# Patient Record
Sex: MALE | Race: WHITE | Employment: OTHER | URBAN - METROPOLITAN AREA
[De-identification: names, ages, dates, MRNs, and addresses within clinical notes are randomized per-mention and may not be internally consistent; named-entity substitution may affect disease eponyms.]

---

## 2018-05-26 ENCOUNTER — APPOINTMENT (OUTPATIENT)
Dept: GENERAL RADIOLOGY | Age: 78
End: 2018-05-26
Attending: EMERGENCY MEDICINE
Payer: MEDICARE

## 2018-05-26 ENCOUNTER — HOSPITAL ENCOUNTER (EMERGENCY)
Age: 78
Discharge: HOME | End: 2018-05-26
Attending: EMERGENCY MEDICINE
Payer: MEDICARE

## 2018-05-26 VITALS
TEMPERATURE: 98 F | OXYGEN SATURATION: 96 % | HEIGHT: 72 IN | WEIGHT: 171.08 LBS | HEART RATE: 61 BPM | BODY MASS INDEX: 23.17 KG/M2 | DIASTOLIC BLOOD PRESSURE: 78 MMHG | SYSTOLIC BLOOD PRESSURE: 174 MMHG | RESPIRATION RATE: 18 BRPM

## 2018-05-26 DIAGNOSIS — R50.9 ACUTE FEBRILE ILLNESS: Primary | ICD-10-CM

## 2018-05-26 DIAGNOSIS — J22 ACUTE LOWER RESPIRATORY INFECTION: ICD-10-CM

## 2018-05-26 LAB
ANION GAP SERPL CALC-SCNC: 8 MMOL/L (ref 5–15)
APPEARANCE UR: CLEAR
BACTERIA URNS QL MICRO: NEGATIVE /HPF
BASOPHILS # BLD: 0 K/UL (ref 0–0.1)
BASOPHILS NFR BLD: 0 % (ref 0–1)
BILIRUB UR QL: NEGATIVE
BUN SERPL-MCNC: 17 MG/DL (ref 6–20)
BUN/CREAT SERPL: 16 (ref 12–20)
CALCIUM SERPL-MCNC: 8.3 MG/DL (ref 8.5–10.1)
CAOX CRY URNS QL MICRO: ABNORMAL
CHLORIDE SERPL-SCNC: 101 MMOL/L (ref 97–108)
CO2 SERPL-SCNC: 26 MMOL/L (ref 21–32)
COLOR UR: ABNORMAL
CREAT SERPL-MCNC: 1.07 MG/DL (ref 0.7–1.3)
DIFFERENTIAL METHOD BLD: ABNORMAL
EOSINOPHIL # BLD: 0 K/UL (ref 0–0.4)
EOSINOPHIL NFR BLD: 0 % (ref 0–7)
EPITH CASTS URNS QL MICRO: ABNORMAL /LPF
ERYTHROCYTE [DISTWIDTH] IN BLOOD BY AUTOMATED COUNT: 14.3 % (ref 11.5–14.5)
GLUCOSE SERPL-MCNC: 129 MG/DL (ref 65–100)
GLUCOSE UR STRIP.AUTO-MCNC: NEGATIVE MG/DL
HCT VFR BLD AUTO: 40.7 % (ref 36.6–50.3)
HGB BLD-MCNC: 14 G/DL (ref 12.1–17)
HGB UR QL STRIP: ABNORMAL
KETONES UR QL STRIP.AUTO: NEGATIVE MG/DL
LEUKOCYTE ESTERASE UR QL STRIP.AUTO: NEGATIVE
LYMPHOCYTES # BLD: 0.3 K/UL
LYMPHOCYTES NFR BLD: 8 % (ref 12–49)
MCH RBC QN AUTO: 30.1 PG (ref 26–34)
MCHC RBC AUTO-ENTMCNC: 34.4 G/DL (ref 30–36.5)
MCV RBC AUTO: 87.5 FL (ref 80–99)
MONOCYTES # BLD: 0.8 K/UL (ref 0–1)
MONOCYTES NFR BLD: 18 % (ref 5–13)
MUCOUS THREADS URNS QL MICRO: ABNORMAL /LPF
NEUTS SEG # BLD: 3.2 K/UL (ref 1.8–8)
NEUTS SEG NFR BLD: 74 % (ref 32–75)
NITRITE UR QL STRIP.AUTO: NEGATIVE
PH UR STRIP: 5.5 [PH] (ref 5–8)
PLATELET # BLD AUTO: 137 K/UL (ref 150–400)
PMV BLD AUTO: 9.4 FL (ref 8.9–12.9)
POTASSIUM SERPL-SCNC: 3.9 MMOL/L (ref 3.5–5.1)
PROT UR STRIP-MCNC: NEGATIVE MG/DL
RBC # BLD AUTO: 4.65 M/UL (ref 4.1–5.7)
RBC #/AREA URNS HPF: ABNORMAL /HPF (ref 0–5)
RBC MORPH BLD: ABNORMAL
SODIUM SERPL-SCNC: 135 MMOL/L (ref 136–145)
SP GR UR REFRACTOMETRY: 1.01 (ref 1–1.03)
UR CULT HOLD, URHOLD: NORMAL
UROBILINOGEN UR QL STRIP.AUTO: 0.2 EU/DL (ref 0.2–1)
WBC # BLD AUTO: 4.3 K/UL (ref 4.1–11.1)
WBC URNS QL MICRO: ABNORMAL /HPF (ref 0–4)
XXWBCSUS: 0

## 2018-05-26 PROCEDURE — 99283 EMERGENCY DEPT VISIT LOW MDM: CPT

## 2018-05-26 PROCEDURE — 80048 BASIC METABOLIC PNL TOTAL CA: CPT | Performed by: EMERGENCY MEDICINE

## 2018-05-26 PROCEDURE — 85025 COMPLETE CBC W/AUTO DIFF WBC: CPT | Performed by: EMERGENCY MEDICINE

## 2018-05-26 PROCEDURE — 36415 COLL VENOUS BLD VENIPUNCTURE: CPT | Performed by: EMERGENCY MEDICINE

## 2018-05-26 PROCEDURE — 74011250636 HC RX REV CODE- 250/636: Performed by: EMERGENCY MEDICINE

## 2018-05-26 PROCEDURE — 74011250637 HC RX REV CODE- 250/637: Performed by: EMERGENCY MEDICINE

## 2018-05-26 PROCEDURE — 81001 URINALYSIS AUTO W/SCOPE: CPT | Performed by: EMERGENCY MEDICINE

## 2018-05-26 PROCEDURE — 71046 X-RAY EXAM CHEST 2 VIEWS: CPT

## 2018-05-26 RX ORDER — PHENAZOPYRIDINE HYDROCHLORIDE 200 MG/1
200 TABLET, FILM COATED ORAL 3 TIMES DAILY
Qty: 6 TAB | Refills: 0 | Status: SHIPPED | OUTPATIENT
Start: 2018-05-26 | End: 2018-05-28

## 2018-05-26 RX ORDER — DOXYCYCLINE HYCLATE 100 MG
100 TABLET ORAL 2 TIMES DAILY
Qty: 13 TAB | Refills: 0 | Status: SHIPPED | OUTPATIENT
Start: 2018-05-26

## 2018-05-26 RX ORDER — LISINOPRIL 10 MG/1
10 TABLET ORAL DAILY
COMMUNITY
End: 2022-05-19

## 2018-05-26 RX ORDER — TAMSULOSIN HYDROCHLORIDE 0.4 MG/1
0.4 CAPSULE ORAL DAILY
COMMUNITY

## 2018-05-26 RX ORDER — DOXYCYCLINE HYCLATE 100 MG
100 TABLET ORAL
Status: COMPLETED | OUTPATIENT
Start: 2018-05-26 | End: 2018-05-26

## 2018-05-26 RX ORDER — ROSUVASTATIN CALCIUM 20 MG/1
20 TABLET, COATED ORAL
COMMUNITY

## 2018-05-26 RX ORDER — GLUCOSAMINE SULFATE 1500 MG
1000 POWDER IN PACKET (EA) ORAL DAILY
COMMUNITY

## 2018-05-26 RX ADMIN — SODIUM CHLORIDE 1000 ML: 900 INJECTION, SOLUTION INTRAVENOUS at 17:53

## 2018-05-26 RX ADMIN — DOXYCYCLINE HYCLATE 100 MG: 100 TABLET, COATED ORAL at 18:47

## 2018-05-26 NOTE — ED NOTES
Pt given discharge instructions by Dr Rachel Omalley he verbalizes an understanding pt stable at time of discharge ambulates to sulma

## 2018-05-26 NOTE — ED PROVIDER NOTES
HPI Comments: This patient presents with a cough and fever. His cough began 3 days ago and is dry. It is getting worse. Typically in the late afternoon, he will get a fever as well. This is the third day in a row of a temperature in the late afternoon up to 102. About 90 minutes ago, he checked his temperature and it was 101. Immediately, he took 1 g of Tylenol. On arrival here, he is afebrile. He is recovering from a left sided knee replacement done 3-4 months ago. It is taking a while for him to heal and for the swelling to go down. Swelling of the left knee continues to improve. It is not worse. Pain in the left knee is stable. He denies any chest or abdominal pain. No rib pain. With exertion, he gets a little bit winded but this is very stable for him and certainly not worse since he started getting sick. Flu vaccination is up-to-date. He called his primary care physician and was advised to come to the emergency department for evaluation. His appetite has been poor and he has not been drinking very much. He is not a diabetic but does have hypertension. He denies any lung disease. He says that his wife has told him that he has had some audible wheezing at home but not now. Old chart reviewed - last ED visit was for a fall and facial lac in 2012. Past Medical History:   Diagnosis Date    CAD (coronary artery disease)     High cholesterol     Hypertension     Irregular heart beat     Pneumonia        Past Surgical History:   Procedure Laterality Date    HX HERNIA REPAIR      X2    HX KNEE REPLACEMENT      bilateral         History reviewed. No pertinent family history. Social History     Social History    Marital status:      Spouse name: N/A    Number of children: N/A    Years of education: N/A     Occupational History    Not on file.      Social History Main Topics    Smoking status: Never Smoker    Smokeless tobacco: Never Used    Alcohol use Yes      Comment: one a week    Drug use: No    Sexual activity: Not on file     Other Topics Concern    Not on file     Social History Narrative         ALLERGIES: Review of patient's allergies indicates no known allergies. Review of Systems   All other systems reviewed and are negative. Vitals:    05/26/18 1712   BP: 150/78   Pulse: 66   Resp: 22   Temp: 98.8 °F (37.1 °C)   SpO2: 96%   Weight: 77.6 kg (171 lb 1.2 oz)   Height: 6' (1.829 m)            Physical Exam   Constitutional: He appears well-developed and well-nourished. No distress. HENT:   Head: Normocephalic. Nose: Nose normal.   Mouth/Throat: Oropharynx is clear and moist.   Eyes: Conjunctivae are normal. Pupils are equal, round, and reactive to light. Neck: No tracheal deviation present. Cardiovascular: Normal rate, regular rhythm, normal heart sounds and intact distal pulses. Pulmonary/Chest: Effort normal and breath sounds normal. He has no wheezes. He has no rales. Abdominal: Soft. He exhibits no distension. There is no tenderness. There is no rebound. Musculoskeletal: He exhibits edema (mild L knee edema. post surgical). Neurological: He is alert. Skin: Skin is warm and dry. He is not diaphoretic. Psychiatric: He has a normal mood and affect. MDM      ED Course       Procedures    Reviewed cxr images         IVF  Tolerated po  Had orange urine at home - better here when urinated, he says - 6:32 PM        6:47 PM - DC home. Plan is course of doxy in case this is bacterial.  Could be viral as well when reviewing CBC. I have discussed case with his PCP, Dr Kelly Shields, who sent him in. Agrees with workup. Will see pt later this coming week. If worse, pt will return here.       Recent Results (from the past 12 hour(s))   CBC WITH AUTOMATED DIFF    Collection Time: 05/26/18  5:47 PM   Result Value Ref Range    WBC 4.3 4.1 - 11.1 K/uL    RBC 4.65 4.10 - 5.70 M/uL    HGB 14.0 12.1 - 17.0 g/dL    HCT 40.7 36.6 - 50.3 %    MCV 87.5 80.0 - 99.0 FL    MCH 30.1 26.0 - 34.0 PG    MCHC 34.4 30.0 - 36.5 g/dL    RDW 14.3 11.5 - 14.5 %    PLATELET 168 (L) 299 - 400 K/uL    MPV 9.4 8.9 - 12.9 FL    NEUTROPHILS 74 32 - 75 %    LYMPHOCYTES 8 (L) 12 - 49 %    MONOCYTES 18 (H) 5 - 13 %    EOSINOPHILS 0 0 - 7 %    BASOPHILS 0 0 - 1 %    ABS. NEUTROPHILS 3.2 1.8 - 8.0 K/UL    ABS. LYMPHOCYTES 0.3 K/UL    ABS. MONOCYTES 0.8 0.0 - 1.0 K/UL    ABS. EOSINOPHILS 0.0 0.0 - 0.4 K/UL    ABS. BASOPHILS 0.0 0.0 - 0.1 K/UL    DF SMEAR SCANNED      RBC COMMENTS NORMOCYTIC, NORMOCHROMIC      XXWBCSUS 0     METABOLIC PANEL, BASIC    Collection Time: 05/26/18  5:47 PM   Result Value Ref Range    Sodium 135 (L) 136 - 145 mmol/L    Potassium 3.9 3.5 - 5.1 mmol/L    Chloride 101 97 - 108 mmol/L    CO2 26 21 - 32 mmol/L    Anion gap 8 5 - 15 mmol/L    Glucose 129 (H) 65 - 100 mg/dL    BUN 17 6 - 20 MG/DL    Creatinine 1.07 0.70 - 1.30 MG/DL    BUN/Creatinine ratio 16 12 - 20      GFR est AA >60 >60 ml/min/1.73m2    GFR est non-AA >60 >60 ml/min/1.73m2    Calcium 8.3 (L) 8.5 - 10.1 MG/DL   URINALYSIS W/MICROSCOPIC    Collection Time: 05/26/18  6:25 PM   Result Value Ref Range    Color YELLOW/STRAW      Appearance CLEAR CLEAR      Specific gravity 1.010 1.003 - 1.030      pH (UA) 5.5 5.0 - 8.0      Protein NEGATIVE  NEG mg/dL    Glucose NEGATIVE  NEG mg/dL    Ketone NEGATIVE  NEG mg/dL    Bilirubin NEGATIVE  NEG      Blood TRACE (A) NEG      Urobilinogen 0.2 0.2 - 1.0 EU/dL    Nitrites NEGATIVE  NEG      Leukocyte Esterase NEGATIVE  NEG      WBC 0-4 0 - 4 /hpf    RBC 0-5 0 - 5 /hpf    Epithelial cells FEW FEW /lpf    Bacteria NEGATIVE  NEG /hpf    Mucus 2+ (A) NEG /lpf    CA Oxalate crystals FEW (A) NEG     URINE CULTURE HOLD SAMPLE    Collection Time: 05/26/18  6:25 PM   Result Value Ref Range    Urine culture hold        URINE ON HOLD IN MICROBIOLOGY DEPT FOR 3 DAYS. IF UNPRESERVED URINE IS SUBMITTED, IT CANNOT BE USED FOR ADDITIONAL TESTING AFTER 24 HRS, RECOLLECTION WILL BE REQUIRED.

## 2018-05-26 NOTE — ED TRIAGE NOTES
Pt ambulates to treatment area he states that for the past 3 days he has had a fever in the evenings anywhere from 101-102 with a nagging cough that is worse when he talks. He wakes in the morning feeling fine then by noontime he starts to feel fatigued with some aches. He called his PCP and they sent him here to be checked for pneumonia.

## 2018-05-26 NOTE — DISCHARGE INSTRUCTIONS
Bronchitis: Care Instructions  Your Care Instructions    Bronchitis is inflammation of the bronchial tubes, which carry air to the lungs. The tubes swell and produce mucus, or phlegm. The mucus and inflamed bronchial tubes make you cough. You may have trouble breathing. Most cases of bronchitis are caused by viruses like those that cause colds. Antibiotics usually do not help and they may be harmful. Bronchitis usually develops rapidly and lasts about 2 to 3 weeks in otherwise healthy people. Follow-up care is a key part of your treatment and safety. Be sure to make and go to all appointments, and call your doctor if you are having problems. It's also a good idea to know your test results and keep a list of the medicines you take. How can you care for yourself at home? · Take all medicines exactly as prescribed. Call your doctor if you think you are having a problem with your medicine. · Get some extra rest.  · Take an over-the-counter pain medicine, such as acetaminophen (Tylenol), ibuprofen (Advil, Motrin), or naproxen (Aleve) to reduce fever and relieve body aches. Read and follow all instructions on the label. · Do not take two or more pain medicines at the same time unless the doctor told you to. Many pain medicines have acetaminophen, which is Tylenol. Too much acetaminophen (Tylenol) can be harmful. · Take an over-the-counter cough medicine that contains dextromethorphan to help quiet a dry, hacking cough so that you can sleep. Avoid cough medicines that have more than one active ingredient. Read and follow all instructions on the label. · Breathe moist air from a humidifier, hot shower, or sink filled with hot water. The heat and moisture will thin mucus so you can cough it out. · Do not smoke. Smoking can make bronchitis worse. If you need help quitting, talk to your doctor about stop-smoking programs and medicines. These can increase your chances of quitting for good.   When should you call for help? Call 911 anytime you think you may need emergency care. For example, call if:  ? · You have severe trouble breathing. ?Call your doctor now or seek immediate medical care if:  ? · You have new or worse trouble breathing. ? · You cough up dark brown or bloody mucus (sputum). ? · You have a new or higher fever. ? · You have a new rash. ? Watch closely for changes in your health, and be sure to contact your doctor if:  ? · You cough more deeply or more often, especially if you notice more mucus or a change in the color of your mucus. ? · You are not getting better as expected. Where can you learn more? Go to http://kel-mary.info/. Enter H333 in the search box to learn more about \"Bronchitis: Care Instructions. \"  Current as of: May 12, 2017  Content Version: 11.4  © 6083-2713 [employer]. Care instructions adapted under license by [employer] (which disclaims liability or warranty for this information). If you have questions about a medical condition or this instruction, always ask your healthcare professional. [place] [address] any warranty or liability for your use of this information. Learning About Fever  What is a fever? A fever is a high body temperature. It's one way your body fights being sick. A fever shows that the body is responding to infection or other illnesses, both minor and severe. A fever is a symptom, not an illness by itself. A fever can be a sign that you are ill, but most fevers are not caused by a serious problem. You may have a fever with a minor illness, such as a cold. But sometimes a very serious infection may cause little or no fever. It is important to look at other symptoms, other conditions you have, and how you feel in general. In children, notice how they act and see what symptoms they complain of. What is a normal body temperature? A normal body temperature is about 98. 6ºF.  Some people have a normal temperature that is a little higher or a little lower than this. Your temperature may be a little lower in the morning than it is later in the day. It may go up during hot weather or when you exercise, wear heavy clothes, or take a hot bath. Your temperature may also be different depending on how you take it. A temperature taken in the mouth (oral) or under the arm may be a little lower than your core temperature (rectal). What is a fever temperature? A core temperature of 100.4°F or above is considered a fever. What can cause a fever? A fever may be caused by:  · Infections. This is the most common cause of a fever. Examples of infections that can cause a fever include the flu, a kidney infection, or pneumonia. · Some medicines. · Severe trauma or injury, such as a heart attack, stroke, heatstroke, or burns. · Other medical conditions, such as arthritis and some cancers. How can you treat a fever at home? · Ask your doctor if you can take an over-the-counter pain medicine, such as acetaminophen (Tylenol), ibuprofen (Advil, Motrin), or naproxen (Aleve). Be safe with medicines. Read and follow all instructions on the label. · To prevent dehydration, drink plenty of fluids. Choose water and other caffeine-free clear liquids until you feel better. If you have kidney, heart, or liver disease and have to limit fluids, talk with your doctor before you increase the amount of fluids you drink. Follow-up care is a key part of your treatment and safety. Be sure to make and go to all appointments, and call your doctor if you are having problems. It's also a good idea to know your test results and keep a list of the medicines you take. Where can you learn more? Go to http://kel-mary.info/. Enter T263 in the search box to learn more about \"Learning About Fever. \"  Current as of: March 20, 2017  Content Version: 11.4  © 9035-1064 [employer].  Care instructions adapted under license by [employer] (which disclaims liability or warranty for this information). If you have questions about a medical condition or this instruction, always ask your healthcare professional. [address] any warranty or liability for your use of this information.

## 2022-05-11 ENCOUNTER — DOCUMENTATION ONLY (OUTPATIENT)
Dept: CARDIOLOGY CLINIC | Age: 82
End: 2022-05-11

## 2022-05-11 NOTE — PROGRESS NOTES
Received records from Dr. Horton Been dated 2022. C/o bradycardia, edema, & SOB. Had recently added HCTZ 12.5 mg po daily. Stress echo (2021): LVEF 55%. No ischemia. 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 Echo (2018): LVEF 50-55%. Mild to mod TR. Father  age 66, had hemachromatosis. Mother  age 80. Cardiac meds include losartan 100 mg po daily, HCTZ 12.5 mg po daily, rosuvastatin 20 mg po daily, & ASA 81 mg po daily. On no AV ulisses blockers. 2/6 ejection murmur on exam at aortic area radiating to the precordium. TSH 1.84 in 2021. ECG (2022): Sinus bradycardia 43 bpm with PACs, 1st degree AVB, & RBBB.   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

## 2022-05-19 ENCOUNTER — OFFICE VISIT (OUTPATIENT)
Dept: CARDIOLOGY CLINIC | Age: 82
End: 2022-05-19
Payer: MEDICARE

## 2022-05-19 VITALS
DIASTOLIC BLOOD PRESSURE: 84 MMHG | WEIGHT: 187.8 LBS | SYSTOLIC BLOOD PRESSURE: 142 MMHG | HEIGHT: 71 IN | HEART RATE: 53 BPM | RESPIRATION RATE: 18 BRPM | OXYGEN SATURATION: 97 % | BODY MASS INDEX: 26.29 KG/M2

## 2022-05-19 DIAGNOSIS — R00.1 SINUS BRADYCARDIA: Primary | ICD-10-CM

## 2022-05-19 DIAGNOSIS — I45.89 CHRONOTROPIC INCOMPETENCE: ICD-10-CM

## 2022-05-19 DIAGNOSIS — I44.1 2ND DEGREE AV BLOCK: ICD-10-CM

## 2022-05-19 DIAGNOSIS — I10 PRIMARY HYPERTENSION: ICD-10-CM

## 2022-05-19 DIAGNOSIS — I44.0 1ST DEGREE AV BLOCK: ICD-10-CM

## 2022-05-19 PROBLEM — I45.10 RBBB: Status: ACTIVE | Noted: 2022-05-19

## 2022-05-19 PROCEDURE — 99204 OFFICE O/P NEW MOD 45 MIN: CPT | Performed by: INTERNAL MEDICINE

## 2022-05-19 PROCEDURE — G8753 SYS BP > OR = 140: HCPCS | Performed by: INTERNAL MEDICINE

## 2022-05-19 PROCEDURE — G8536 NO DOC ELDER MAL SCRN: HCPCS | Performed by: INTERNAL MEDICINE

## 2022-05-19 PROCEDURE — G8427 DOCREV CUR MEDS BY ELIG CLIN: HCPCS | Performed by: INTERNAL MEDICINE

## 2022-05-19 PROCEDURE — G8510 SCR DEP NEG, NO PLAN REQD: HCPCS | Performed by: INTERNAL MEDICINE

## 2022-05-19 PROCEDURE — G8754 DIAS BP LESS 90: HCPCS | Performed by: INTERNAL MEDICINE

## 2022-05-19 PROCEDURE — 1101F PT FALLS ASSESS-DOCD LE1/YR: CPT | Performed by: INTERNAL MEDICINE

## 2022-05-19 PROCEDURE — G8419 CALC BMI OUT NRM PARAM NOF/U: HCPCS | Performed by: INTERNAL MEDICINE

## 2022-05-19 PROCEDURE — G0463 HOSPITAL OUTPT CLINIC VISIT: HCPCS | Performed by: INTERNAL MEDICINE

## 2022-05-19 RX ORDER — MELATONIN 5 MG
5 CAPSULE ORAL
COMMUNITY

## 2022-05-19 RX ORDER — HYDROCHLOROTHIAZIDE 12.5 MG/1
12.5 CAPSULE ORAL DAILY
COMMUNITY

## 2022-05-19 RX ORDER — ASPIRIN 81 MG/1
81 TABLET ORAL DAILY
COMMUNITY

## 2022-05-19 RX ORDER — LOSARTAN POTASSIUM 100 MG/1
100 TABLET ORAL DAILY
COMMUNITY
Start: 2022-05-06

## 2022-05-19 RX ORDER — CHLORHEXIDINE GLUCONATE 4 G/100ML
SOLUTION TOPICAL
Qty: 1 EACH | Refills: 0 | Status: SHIPPED | OUTPATIENT
Start: 2022-05-19 | End: 2022-08-08

## 2022-05-19 RX ORDER — MELOXICAM 15 MG/1
1 TABLET ORAL DAILY
COMMUNITY
Start: 2022-05-03

## 2022-05-19 RX ORDER — LISINOPRIL 10 MG/1
10 TABLET ORAL DAILY
Qty: 90 TABLET | Refills: 1
Start: 2022-05-19 | End: 2022-05-19

## 2022-05-19 NOTE — PATIENT INSTRUCTIONS
Your dual chamber pacemaker procedure has been scheduled for 8/8/2022 at 11:00 AM, at [facility].    Please report to Admitting Department by 9:00 AM, or 2 hours prior to your scheduled procedure. Please bring a list of your current medications and medication bottles, if able, to the hospital on this day. You will be unable to drive after your procedure so please make sure to bring someone with you to your procedure. You will need to have nothing to eat or drink after midnight, the night prior to your procedure. You may have small sips of water, if needed, to take with your medication. You will also need to see Dr. Dewey Aschoff Nurse Practitioner, Mickey Stewart, in office prior to your procedure. An appointment has been scheduled for 7/18/22 at 10:30 AM.    You will need labs drawn prior to your procedure. Please have this done when you come in for your pre-procedure visit with Mickey Stewart, BURAK. You should not stop taking any of your scheduled medications prior to your scheduled procedure. After your procedure, you will need to follow up with Dr. Dewey Aschoff nurse for a wound and device check. Your follow-up appointment has been scheduled for 8/19/22 at 10:20 AM.     Hibiclens 4% topical solution has been ordered and sent into your pharmacy  Patient it start Hibiclens application 5 days prior to procedure date    Directions Hibiclens 4%: Start cleanse 5 days prior to procedure   1. Rinse area (upper chest and upper arms) with water. 2. Apply minimum amount necessary to scrub the upper chest area from shoulder/neck to mid line of chest and to below the nipple each of  5 nights before the day of the procedure  3. Let solution dry.

## 2022-05-19 NOTE — PROGRESS NOTES
Chief Complaint   Patient presents with    Shortness of Breath     on lite exertion-evaluation for pacemaker     1. Have you been to the ER, urgent care clinic since your last visit? Hospitalized since your last visit? No    2. Have you seen or consulted any other health care providers outside of the [address] [place] [address] since your last visit? Include any pap smears or colon screening.  No.

## 2022-05-19 NOTE — PROGRESS NOTES
Cardiac Electrophysiology OFFICE Consultation Note     Subjective:       Alanis Cabrera is an 79 y.o. patient who presents for consideration for pacemaker implant.    He was kindly referred by Dr. Vita Londono. Sinus bradycardia is down to the 40s with 1st degree AVB & RBBB on ECG in 2022. Chronotropic incompetence & transient 2nd degree AVB reportedly noted during stress echo in 2021.       LVEF normal on stress echo in 2021. Symptomatic with MYERS & fatigue.       BP borderline elevated. Previous:   TSH 1.84 in 2021. Dr. Vita Londono is primary cardiologist.     Father  age 66, had hemachromatosis.  Mother  age 80.  Brother with pacemaker. Problem List    Sinus bradycardia ICD-10-CM: R00.1   ICD-9-CM: 427.89 2022     1st degree AV block ICD-10-CM: I44.0   ICD-9-CM: 426.11 2022     2nd degree AV block ICD-10-CM: I44.1   ICD-9-CM: 426.13 2022     RBBB ICD-10-CM: I45.10   ICD-9-CM: 426.4 2022           Current Outpatient Medications   Medication Sig Dispense Refill   · hydroCHLOROthiazide (MICROZIDE) 12.5 mg capsule Take 12.5 mg by mouth daily. · losartan (COZAAR) 100 mg tablet Take 100 mg by mouth daily. · meloxicam (MOBIC) 15 mg tablet Take 1 Tablet by mouth daily. · melatonin 5 mg cap capsule Take 5 mg by mouth nightly. · aspirin delayed-release 81 mg tablet Take 81 mg by mouth daily. · rosuvastatin (CRESTOR) 20 mg tablet Take 20 mg by mouth nightly. · tamsulosin (FLOMAX) 0.4 mg capsule Take 0.4 mg by mouth daily. · cholecalciferol (VITAMIN D3) 1,000 unit cap Take 1,000 Units by mouth daily. · doxycycline (VIBRA-TABS) 100 mg tablet Take 1 Tab by mouth two (2) times a day.  (Patient not taking: Reported on 2022) 13 Tab 0     No Known Allergies   Past Medical History:   Diagnosis Date   · CAD (coronary artery disease)   · High cholesterol   · Hypertension   · Irregular heart beat   · Pneumonia     Past Surgical History:   Procedure Laterality Date   · HX HERNIA REPAIR   X2   · HX KNEE REPLACEMENT   bilateral     No family history w pacer  Social History     Tobacco Use   · Smoking status: Never Smoker   · Smokeless tobacco: Never Used   Substance Use Topics   · Alcohol use: Yes   Comment: one a week         Review of Systems: Review of all other systems otherwise negative. Constitutional: Negative for fever, chills, weight loss, + malaise/fatigue. HEENT: Negative for nosebleeds, vision changes. Respiratory: Negative for cough, hemoptysis. Cardiovascular: Negative for chest pain, palpitations, orthopnea, claudication, + occasional leg swelling, no syncope, and PND. + MYERS   Gastrointestinal: Negative for nausea, vomiting, diarrhea, blood in stool and melena. Genitourinary: Negative for dysuria, and hematuria. Musculoskeletal: Negative for myalgias, arthralgia. Skin: Negative for rash. Heme: Does not bleed or bruise easily. Neurological: Negative for speech change and focal weakness.       Objective:   Visit Vitals  BP (!) 142/84   Pulse (!) 53   Resp 18   Ht 5' 11\" (1.803 m)   Wt 187 lb 12.8 oz (85.2 kg)   SpO2 97%   BMI 26.19 kg/m²         Physical Exam:   Constitutional: Well-developed and well-nourished. No respiratory distress. Head: Normocephalic and atraumatic. Eyes: Pupils are equal, round. ENT: Hearing grossly normal.   Neck: Supple. No JVD present. Cardiovascular: slow rate, regular rhythm. Exam reveals no gallop and no friction rub. No murmur heard. Pulmonary/Chest: Effort normal and breath sounds normal. No wheezes. Abdominal: Soft, no tenderness. Musculoskeletal: Moves extremities independently.  Normal gait. Vasc/lymphatic: No edema. Neurological: Alert,oriented. Skin: Skin is warm and dry   Psychiatric: Normal mood and affect. Behavior is normal. Judgment and thought content normal.       ECG (05/05/2022):  Sinus bradycardia 43 bpm with PACs, 1st degree AVB, & RBBB.  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 Assessment/Plan:     Imaging/Studies:   Stress echo (11/23/2021): LVEF 55%.  No ischemia.  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         Echo (02/07/2018): LVEF 50-55%.  Mild to mod TR. ICD-10-CM ICD-9-CM    1. Sinus bradycardia  R00.1 427.89    2. Primary hypertension  I10 401.9    3. 1st degree AV block  I44.0 426.11    4. 2nd degree AV block  I44.1 426.13    5. Chronotropic incompetence  I45.89 426.89          Sinus bradycardia: Also with chronotropic incompetence noted via stress echo per Dr. Tae Pemberton.  1st degree AVB at baseline, had intermittent 2nd degree AVB during stress echo. Irreversible.    Symptomatic with SOB & fatigue.      Reviewed risks/benefits of dual chamber pacemaker implant.  He agrees to proceed with scheduling. HTN: BP suboptimally controlled here today, sometimes borderline high at home per his report.  BP could be controlled with improved HR.  may take hydrochlorothiazide 25 mg once a day    Risks involve device implant include but are not limited to bleeding, infection, valvular damage, heart attack, stroke, lung collapse (pneumothorax or hemothorax), heart collapse (pericardial tamponade), heart perforation, kidney failure, death. Elective or emergency surgery may be required to repair some of these complications. Prolonged hospitalization would be required. General anesthesia may be required for the procedure. Future Appointments   Date Time Provider Christiano Garcia   7/18/2022 10:30 AM BURAK Lemus BS AMB   8/19/2022 10:20 AM PACEMAKER3, ARGENTINA POTTS AMB   8/19/2022 10:30 AM WOUND CHECKS, ARGENTINA ENGEL BS AMB         Thank you for involving me in this patient's care and please call with further concerns or questions. Eva Elkins M.D.    Electrophysiology/Cardiology   [facility]   [address], [place], [address] [place] [address]                             [address] [address]   [phone]

## 2022-06-28 ENCOUNTER — DOCUMENTATION ONLY (OUTPATIENT)
Dept: CARDIOLOGY CLINIC | Age: 82
End: 2022-06-28

## 2022-07-21 NOTE — PROGRESS NOTES
Cardiac Electrophysiology OFFICE Note     Subjective:      Sunday Foote is a 79 y.o. patient who presents for H&P update prior to upcoming planned dual chamber pacemaker implant (scheduled for 2022). Sinus bradycardia is down to the 40s with 1st degree AVB & RBBB on ECG in 2022. Chronotropic incompetence & transient 2nd degree AVB reportedly noted during stress echo in 2021. LVEF normal on stress echo in 2021. Symptomatic with MYERS & fatigue. States he especially notes shortness of breath when going up & down stairs. BP controlled. Previous:  TSH 1.84 in 2021. Dr. Magen Herrera is primary cardiologist.    Father  age 66, had hemachromatosis. Mother  age 80. Brother with pacemaker. Problem List  Date Reviewed: 2022            Codes Class Noted    Sinus bradycardia ICD-10-CM: R00.1  ICD-9-CM: 427.89  2022        1st degree AV block ICD-10-CM: I44.0  ICD-9-CM: 426.11  2022        2nd degree AV block ICD-10-CM: I44.1  ICD-9-CM: 426.13  2022        RBBB ICD-10-CM: I45.10  ICD-9-CM: 426.4  2022           Current Outpatient Medications   Medication Sig Dispense Refill    hydroCHLOROthiazide (MICROZIDE) 12.5 mg capsule Take 12.5 mg by mouth daily. losartan (COZAAR) 100 mg tablet Take 100 mg by mouth daily. meloxicam (MOBIC) 15 mg tablet Take 1 Tablet by mouth daily. melatonin 5 mg cap capsule Take 5 mg by mouth nightly. aspirin delayed-release 81 mg tablet Take 81 mg by mouth daily. rosuvastatin (CRESTOR) 20 mg tablet Take 20 mg by mouth nightly. tamsulosin (FLOMAX) 0.4 mg capsule Take 0.4 mg by mouth daily. cholecalciferol (VITAMIN D3) 25 mcg (1,000 unit) cap Take 1,000 Units by mouth daily. chlorhexidine (Hibiclens) 4 % liquid Apply to the upper chest area from shoulder/neck to mid line of chest and to below the nipple every day, 5 days prior to the procedure.  (Patient not taking: Reported on 7/26/2022) 1 Each 0    doxycycline (VIBRA-TABS) 100 mg tablet Take 1 Tab by mouth two (2) times a day. (Patient not taking: No sig reported) 13 Tab 0     No Known Allergies  Past Medical History:   Diagnosis Date    CAD (coronary artery disease)     High cholesterol     Hypertension     Irregular heart beat     Pneumonia      Past Surgical History:   Procedure Laterality Date    HX HERNIA REPAIR      X2    HX KNEE REPLACEMENT      bilateral     No family history on file. Social History     Tobacco Use    Smoking status: Never    Smokeless tobacco: Never   Substance Use Topics    Alcohol use: Yes     Comment: one a week        Review of Systems: Review of all other systems otherwise negative. Constitutional: Negative for fever, chills, weight loss, + malaise/fatigue. HEENT: Negative for nosebleeds, vision changes. Respiratory: Negative for cough, hemoptysis  Cardiovascular: Negative for chest pain, palpitations, orthopnea, claudication, + occasional leg swelling, no syncope, and PND. + MYERS  Gastrointestinal: Negative for nausea, vomiting, diarrhea, blood in stool and melena. Genitourinary: Negative for dysuria, and hematuria. Musculoskeletal: Negative for myalgias, arthralgia. Skin: Negative for rash. Heme: Does not bleed or bruise easily. Neurological: Negative for speech change and focal weakness     Objective:     Visit Vitals  /80 (BP 1 Location: Left upper arm, BP Patient Position: Sitting, BP Cuff Size: Adult)   Pulse 64   Resp 18   Ht 5' 11\" (1.803 m)   Wt 187 lb 6.4 oz (85 kg)   SpO2 97%   BMI 26.14 kg/m²      Physical Exam:   Constitutional: Well-developed and well-nourished. No respiratory distress. Head: Normocephalic and atraumatic. Eyes: Pupils are equal, round. ENT: Hearing grossly normal.  Neck: Supple. No JVD present. Cardiovascular: Slow rate, regular rhythm. Exam reveals no gallop and no friction rub. No murmur heard.   Pulmonary/Chest: Effort normal and breath sounds normal. No wheezes. Abdominal: Soft, no tenderness. Musculoskeletal: Moves extremities independently. Normal gait. Vasc/lymphatic: No edema. Neurological: Alert,oriented. Skin: Skin is warm and dry. Psychiatric: Normal mood and affect. Behavior is normal. Judgment and thought content normal.      EKG (05/05/2022): Sinus bradycardia 43 bpm with PACs, 1st degree AVB, & RBBB. 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 Assessment/Plan:     Imaging/Studies:  Stress echo (11/23/2021): LVEF 55%. No ischemia. 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 Echo (02/07/2018): LVEF 50-55%. Mild to mod TR. ICD-10-CM ICD-9-CM    1. Sick sinus syndrome (HCC)  I49.5 427.81 CBC WITH AUTOMATED DIFF      METABOLIC PANEL, BASIC      2. Sinus bradycardia  R00.1 427.89 CBC WITH AUTOMATED DIFF      METABOLIC PANEL, BASIC      3. Primary hypertension  I10 401.9 CBC WITH AUTOMATED DIFF      METABOLIC PANEL, BASIC      4. Fatigue, unspecified type  R53.83 780.79 CBC WITH AUTOMATED DIFF      METABOLIC PANEL, BASIC      5. Shortness of breath  R06.02 786.05 CBC WITH AUTOMATED DIFF      METABOLIC PANEL, BASIC          Sinus bradycardia: Also with chronotropic incompetence noted via stress echo per Dr. Marito Tejeda. 1st degree AVB at baseline, had intermittent 2nd degree AVB during stress echo. Irreversible. Symptomatic with SOB & fatigue. Reviewed risks/benefits of dual chamber pacemaker implant. He agrees to proceed as scheduled. Labs ordered. Continue medication regimen as previously prescribed. HTN: BP controlled. Continue current medication regimen. Future Appointments   Date Time Provider Christiano Garcia   8/19/2022 10:20 AM PACEMAKER3, [facility]   8/19/2022 10:30 AM WOUND CHECKS, [facility]     Thank you for involving me in this patient's care and please call with further concerns or questions.     JEFFY Sam [address] [facility] [place]  07/26/22

## 2022-07-21 NOTE — H&P (VIEW-ONLY)
Cardiac Electrophysiology OFFICE Note     Subjective:      Josh Chin is a 79 y.o. patient who presents for H&P update prior to upcoming planned dual chamber pacemaker implant (scheduled for 2022). Sinus bradycardia is down to the 40s with 1st degree AVB & RBBB on ECG in 2022. Chronotropic incompetence & transient 2nd degree AVB reportedly noted during stress echo in 2021. LVEF normal on stress echo in 2021. Symptomatic with MYERS & fatigue. States he especially notes shortness of breath when going up & down stairs. BP controlled. Previous:  TSH 1.84 in 2021. Dr. Saurav Paige is primary cardiologist.    Father  age 66, had hemachromatosis. Mother  age 80. Brother with pacemaker. Problem List  Date Reviewed: 2022            Codes Class Noted    Sinus bradycardia ICD-10-CM: R00.1  ICD-9-CM: 427.89  2022        1st degree AV block ICD-10-CM: I44.0  ICD-9-CM: 426.11  2022        2nd degree AV block ICD-10-CM: I44.1  ICD-9-CM: 426.13  2022        RBBB ICD-10-CM: I45.10  ICD-9-CM: 426.4  2022           Current Outpatient Medications   Medication Sig Dispense Refill    hydroCHLOROthiazide (MICROZIDE) 12.5 mg capsule Take 12.5 mg by mouth daily. losartan (COZAAR) 100 mg tablet Take 100 mg by mouth daily. meloxicam (MOBIC) 15 mg tablet Take 1 Tablet by mouth daily. melatonin 5 mg cap capsule Take 5 mg by mouth nightly. aspirin delayed-release 81 mg tablet Take 81 mg by mouth daily. rosuvastatin (CRESTOR) 20 mg tablet Take 20 mg by mouth nightly. tamsulosin (FLOMAX) 0.4 mg capsule Take 0.4 mg by mouth daily. cholecalciferol (VITAMIN D3) 25 mcg (1,000 unit) cap Take 1,000 Units by mouth daily. chlorhexidine (Hibiclens) 4 % liquid Apply to the upper chest area from shoulder/neck to mid line of chest and to below the nipple every day, 5 days prior to the procedure.  (Patient not taking: Reported on 7/26/2022) 1 Each 0    doxycycline (VIBRA-TABS) 100 mg tablet Take 1 Tab by mouth two (2) times a day. (Patient not taking: No sig reported) 13 Tab 0     No Known Allergies  Past Medical History:   Diagnosis Date    CAD (coronary artery disease)     High cholesterol     Hypertension     Irregular heart beat     Pneumonia      Past Surgical History:   Procedure Laterality Date    HX HERNIA REPAIR      X2    HX KNEE REPLACEMENT      bilateral     No family history on file. Social History     Tobacco Use    Smoking status: Never    Smokeless tobacco: Never   Substance Use Topics    Alcohol use: Yes     Comment: one a week        Review of Systems: Review of all other systems otherwise negative. Constitutional: Negative for fever, chills, weight loss, + malaise/fatigue. HEENT: Negative for nosebleeds, vision changes. Respiratory: Negative for cough, hemoptysis  Cardiovascular: Negative for chest pain, palpitations, orthopnea, claudication, + occasional leg swelling, no syncope, and PND. + MYERS  Gastrointestinal: Negative for nausea, vomiting, diarrhea, blood in stool and melena. Genitourinary: Negative for dysuria, and hematuria. Musculoskeletal: Negative for myalgias, arthralgia. Skin: Negative for rash. Heme: Does not bleed or bruise easily. Neurological: Negative for speech change and focal weakness     Objective:     Visit Vitals  /80 (BP 1 Location: Left upper arm, BP Patient Position: Sitting, BP Cuff Size: Adult)   Pulse 64   Resp 18   Ht 5' 11\" (1.803 m)   Wt 187 lb 6.4 oz (85 kg)   SpO2 97%   BMI 26.14 kg/m²      Physical Exam:   Constitutional: Well-developed and well-nourished. No respiratory distress. Head: Normocephalic and atraumatic. Eyes: Pupils are equal, round. ENT: Hearing grossly normal.  Neck: Supple. No JVD present. Cardiovascular: Slow rate, regular rhythm. Exam reveals no gallop and no friction rub. No murmur heard.   Pulmonary/Chest: Effort normal and breath sounds normal. No wheezes. Abdominal: Soft, no tenderness. Musculoskeletal: Moves extremities independently. Normal gait. Vasc/lymphatic: No edema. Neurological: Alert,oriented. Skin: Skin is warm and dry. Psychiatric: Normal mood and affect. Behavior is normal. Judgment and thought content normal.      EKG (05/05/2022): Sinus bradycardia 43 bpm with PACs, 1st degree AVB, & RBBB. 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 Assessment/Plan:     Imaging/Studies:  Stress echo (11/23/2021): LVEF 55%. No ischemia. 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 Echo (02/07/2018): LVEF 50-55%. Mild to mod TR. ICD-10-CM ICD-9-CM    1. Sick sinus syndrome (HCC)  I49.5 427.81 CBC WITH AUTOMATED DIFF      METABOLIC PANEL, BASIC      2. Sinus bradycardia  R00.1 427.89 CBC WITH AUTOMATED DIFF      METABOLIC PANEL, BASIC      3. Primary hypertension  I10 401.9 CBC WITH AUTOMATED DIFF      METABOLIC PANEL, BASIC      4. Fatigue, unspecified type  R53.83 780.79 CBC WITH AUTOMATED DIFF      METABOLIC PANEL, BASIC      5. Shortness of breath  R06.02 786.05 CBC WITH AUTOMATED DIFF      METABOLIC PANEL, BASIC          Sinus bradycardia: Also with chronotropic incompetence noted via stress echo per Dr. Vale Pate. 1st degree AVB at baseline, had intermittent 2nd degree AVB during stress echo. Irreversible. Symptomatic with SOB & fatigue. Reviewed risks/benefits of dual chamber pacemaker implant. He agrees to proceed as scheduled. Labs ordered. Continue medication regimen as previously prescribed. HTN: BP controlled. Continue current medication regimen. Future Appointments   Date Time Provider Christiano Garcia   8/19/2022 10:20 AM PACEMAKER3, [facility]   8/19/2022 10:30 AM WOUND CHECKS, [facility]     Thank you for involving me in this patient's care and please call with further concerns or questions.     JEFFY Stevenson  [facility] [place]  07/26/22

## 2022-07-26 ENCOUNTER — OFFICE VISIT (OUTPATIENT)
Dept: CARDIOLOGY CLINIC | Age: 82
End: 2022-07-26
Payer: MEDICARE

## 2022-07-26 VITALS
DIASTOLIC BLOOD PRESSURE: 80 MMHG | WEIGHT: 187.4 LBS | RESPIRATION RATE: 18 BRPM | OXYGEN SATURATION: 97 % | HEART RATE: 64 BPM | BODY MASS INDEX: 26.23 KG/M2 | HEIGHT: 71 IN | SYSTOLIC BLOOD PRESSURE: 128 MMHG

## 2022-07-26 DIAGNOSIS — R53.83 FATIGUE, UNSPECIFIED TYPE: ICD-10-CM

## 2022-07-26 DIAGNOSIS — R00.1 SINUS BRADYCARDIA: ICD-10-CM

## 2022-07-26 DIAGNOSIS — R06.02 SHORTNESS OF BREATH: ICD-10-CM

## 2022-07-26 DIAGNOSIS — I10 PRIMARY HYPERTENSION: ICD-10-CM

## 2022-07-26 DIAGNOSIS — I49.5 SICK SINUS SYNDROME (HCC): Primary | ICD-10-CM

## 2022-07-26 PROCEDURE — G8752 SYS BP LESS 140: HCPCS | Performed by: NURSE PRACTITIONER

## 2022-07-26 PROCEDURE — 99214 OFFICE O/P EST MOD 30 MIN: CPT | Performed by: NURSE PRACTITIONER

## 2022-07-26 PROCEDURE — G8417 CALC BMI ABV UP PARAM F/U: HCPCS | Performed by: NURSE PRACTITIONER

## 2022-07-26 PROCEDURE — G8536 NO DOC ELDER MAL SCRN: HCPCS | Performed by: NURSE PRACTITIONER

## 2022-07-26 PROCEDURE — 1123F ACP DISCUSS/DSCN MKR DOCD: CPT | Performed by: NURSE PRACTITIONER

## 2022-07-26 PROCEDURE — G8427 DOCREV CUR MEDS BY ELIG CLIN: HCPCS | Performed by: NURSE PRACTITIONER

## 2022-07-26 PROCEDURE — 1101F PT FALLS ASSESS-DOCD LE1/YR: CPT | Performed by: NURSE PRACTITIONER

## 2022-07-26 PROCEDURE — G0463 HOSPITAL OUTPT CLINIC VISIT: HCPCS | Performed by: NURSE PRACTITIONER

## 2022-07-26 PROCEDURE — G8432 DEP SCR NOT DOC, RNG: HCPCS | Performed by: NURSE PRACTITIONER

## 2022-07-26 PROCEDURE — G8754 DIAS BP LESS 90: HCPCS | Performed by: NURSE PRACTITIONER

## 2022-07-27 ENCOUNTER — TELEPHONE (OUTPATIENT)
Dept: CARDIOLOGY CLINIC | Age: 82
End: 2022-07-27

## 2022-07-27 NOTE — TELEPHONE ENCOUNTER
Called patient, ID verified using two patient identifiers. Asked patient if he would be able to move up his procedure time on 8/8/22. Patient states he is coming from [facility] and that 9:00 am is the earliest he will be able to arrive. Will keep procedure time as scheduled. Patient verbalized understanding and will call with any other questions.

## 2022-08-03 RX ORDER — SODIUM CHLORIDE 0.9 % (FLUSH) 0.9 %
5-40 SYRINGE (ML) INJECTION EVERY 8 HOURS
Status: CANCELLED | OUTPATIENT
Start: 2022-08-03

## 2022-08-03 RX ORDER — SODIUM CHLORIDE 0.9 % (FLUSH) 0.9 %
5-40 SYRINGE (ML) INJECTION AS NEEDED
Status: CANCELLED | OUTPATIENT
Start: 2022-08-03

## 2022-08-08 ENCOUNTER — APPOINTMENT (OUTPATIENT)
Dept: GENERAL RADIOLOGY | Age: 82
End: 2022-08-08
Attending: NURSE PRACTITIONER
Payer: MEDICARE

## 2022-08-08 ENCOUNTER — HOSPITAL ENCOUNTER (OUTPATIENT)
Age: 82
Setting detail: OUTPATIENT SURGERY
Discharge: HOME | End: 2022-08-08
Attending: INTERNAL MEDICINE | Admitting: INTERNAL MEDICINE
Payer: MEDICARE

## 2022-08-08 ENCOUNTER — HOSPITAL ENCOUNTER (OUTPATIENT)
Dept: GENERAL RADIOLOGY | Age: 82
Setting detail: OUTPATIENT SURGERY
Discharge: HOME | End: 2022-08-08
Attending: EMERGENCY MEDICINE
Payer: MEDICARE

## 2022-08-08 ENCOUNTER — HOSPITAL ENCOUNTER (EMERGENCY)
Age: 82
Discharge: HOME | End: 2022-08-09
Attending: EMERGENCY MEDICINE
Payer: MEDICARE

## 2022-08-08 VITALS
HEART RATE: 60 BPM | BODY MASS INDEX: 25.48 KG/M2 | WEIGHT: 182 LBS | DIASTOLIC BLOOD PRESSURE: 94 MMHG | RESPIRATION RATE: 20 BRPM | TEMPERATURE: 97.9 F | HEIGHT: 71 IN | OXYGEN SATURATION: 96 % | SYSTOLIC BLOOD PRESSURE: 116 MMHG

## 2022-08-08 DIAGNOSIS — R55 VASOVAGAL NEAR SYNCOPE: Primary | ICD-10-CM

## 2022-08-08 DIAGNOSIS — I44.1 2ND DEGREE AV BLOCK: ICD-10-CM

## 2022-08-08 DIAGNOSIS — Z95.0 PACEMAKER: Primary | ICD-10-CM

## 2022-08-08 DIAGNOSIS — T50.905A ADVERSE EFFECT OF DRUG, INITIAL ENCOUNTER: ICD-10-CM

## 2022-08-08 DIAGNOSIS — R00.1 BRADYCARDIA: ICD-10-CM

## 2022-08-08 LAB
ALBUMIN SERPL-MCNC: 3.4 G/DL (ref 3.5–5)
ALBUMIN/GLOB SERPL: 1 {RATIO} (ref 1.1–2.2)
ALP SERPL-CCNC: 64 U/L (ref 45–117)
ALT SERPL-CCNC: 40 U/L (ref 12–78)
ANION GAP SERPL CALC-SCNC: 4 MMOL/L (ref 5–15)
AST SERPL-CCNC: 34 U/L (ref 15–37)
BILIRUB SERPL-MCNC: 0.6 MG/DL (ref 0.2–1)
BUN SERPL-MCNC: 27 MG/DL (ref 6–20)
BUN/CREAT SERPL: 24 (ref 12–20)
CALCIUM SERPL-MCNC: 9 MG/DL (ref 8.5–10.1)
CHLORIDE SERPL-SCNC: 107 MMOL/L (ref 97–108)
CO2 SERPL-SCNC: 28 MMOL/L (ref 21–32)
COMMENT, HOLDF: NORMAL
CREAT SERPL-MCNC: 1.12 MG/DL (ref 0.7–1.3)
GLOBULIN SER CALC-MCNC: 3.4 G/DL (ref 2–4)
GLUCOSE SERPL-MCNC: 140 MG/DL (ref 65–100)
POTASSIUM SERPL-SCNC: 3.8 MMOL/L (ref 3.5–5.1)
PROT SERPL-MCNC: 6.8 G/DL (ref 6.4–8.2)
SAMPLES BEING HELD,HOLD: NORMAL
SODIUM SERPL-SCNC: 139 MMOL/L (ref 136–145)

## 2022-08-08 PROCEDURE — 93005 ELECTROCARDIOGRAM TRACING: CPT

## 2022-08-08 PROCEDURE — 85025 COMPLETE CBC W/AUTO DIFF WBC: CPT

## 2022-08-08 PROCEDURE — 2709999900 HC NON-CHARGEABLE SUPPLY: Performed by: INTERNAL MEDICINE

## 2022-08-08 PROCEDURE — C1785 PMKR, DUAL, RATE-RESP: HCPCS | Performed by: INTERNAL MEDICINE

## 2022-08-08 PROCEDURE — 36415 COLL VENOUS BLD VENIPUNCTURE: CPT

## 2022-08-08 PROCEDURE — C1898 LEAD, PMKR, OTHER THAN TRANS: HCPCS | Performed by: INTERNAL MEDICINE

## 2022-08-08 PROCEDURE — C1892 INTRO/SHEATH,FIXED,PEEL-AWAY: HCPCS | Performed by: INTERNAL MEDICINE

## 2022-08-08 PROCEDURE — 74011000250 HC RX REV CODE- 250: Performed by: INTERNAL MEDICINE

## 2022-08-08 PROCEDURE — 74011250636 HC RX REV CODE- 250/636: Performed by: INTERNAL MEDICINE

## 2022-08-08 PROCEDURE — 84484 ASSAY OF TROPONIN QUANT: CPT

## 2022-08-08 PROCEDURE — 99152 MOD SED SAME PHYS/QHP 5/>YRS: CPT | Performed by: INTERNAL MEDICINE

## 2022-08-08 PROCEDURE — C1893 INTRO/SHEATH, FIXED,NON-PEEL: HCPCS | Performed by: INTERNAL MEDICINE

## 2022-08-08 PROCEDURE — 33208 INSRT HEART PM ATRIAL & VENT: CPT | Performed by: INTERNAL MEDICINE

## 2022-08-08 PROCEDURE — 77030018729 HC ELECTRD DEFIB PAD CARD -B: Performed by: INTERNAL MEDICINE

## 2022-08-08 PROCEDURE — 77030018547 HC SUT ETHBND1 J&J -B: Performed by: INTERNAL MEDICINE

## 2022-08-08 PROCEDURE — 71046 X-RAY EXAM CHEST 2 VIEWS: CPT

## 2022-08-08 PROCEDURE — 80053 COMPREHEN METABOLIC PANEL: CPT

## 2022-08-08 PROCEDURE — 77030022704 HC SUT VLOC COVD -B: Performed by: INTERNAL MEDICINE

## 2022-08-08 PROCEDURE — 71045 X-RAY EXAM CHEST 1 VIEW: CPT

## 2022-08-08 PROCEDURE — 77030041075 HC DRSG AG OPTIFRM MDII -B: Performed by: INTERNAL MEDICINE

## 2022-08-08 PROCEDURE — C1781 MESH (IMPLANTABLE): HCPCS | Performed by: INTERNAL MEDICINE

## 2022-08-08 PROCEDURE — 77030032060 HC PWDR HEMSTAT ARISTA ASRB 3GM BARD -C: Performed by: INTERNAL MEDICINE

## 2022-08-08 PROCEDURE — 99153 MOD SED SAME PHYS/QHP EA: CPT | Performed by: INTERNAL MEDICINE

## 2022-08-08 PROCEDURE — 74011250637 HC RX REV CODE- 250/637: Performed by: NURSE PRACTITIONER

## 2022-08-08 DEVICE — ENVELOPE CMRM6133 ABSORB LRG MR
Type: IMPLANTABLE DEVICE | Status: FUNCTIONAL
Brand: TYRX™

## 2022-08-08 DEVICE — IPG W1DR01 AZURE XT DR MRI USA
Type: IMPLANTABLE DEVICE | Status: FUNCTIONAL
Brand: AZURE™ XT DR MRI SURESCAN™

## 2022-08-08 DEVICE — LEAD 5076-58 MRI US RCMCRD
Type: IMPLANTABLE DEVICE | Status: FUNCTIONAL
Brand: CAPSUREFIX NOVUS MRI™ SURESCAN®

## 2022-08-08 DEVICE — LEAD 5076-52 MRI US RCMCRD
Type: IMPLANTABLE DEVICE | Status: FUNCTIONAL
Brand: CAPSUREFIX NOVUS MRI™ SURESCAN®

## 2022-08-08 RX ORDER — ACETAMINOPHEN 325 MG/1
650 TABLET ORAL
Status: DISCONTINUED | OUTPATIENT
Start: 2022-08-08 | End: 2022-08-08 | Stop reason: HOSPADM

## 2022-08-08 RX ORDER — FENTANYL CITRATE 50 UG/ML
INJECTION, SOLUTION INTRAMUSCULAR; INTRAVENOUS AS NEEDED
Status: DISCONTINUED | OUTPATIENT
Start: 2022-08-08 | End: 2022-08-08 | Stop reason: HOSPADM

## 2022-08-08 RX ORDER — MIDAZOLAM HYDROCHLORIDE 1 MG/ML
INJECTION, SOLUTION INTRAMUSCULAR; INTRAVENOUS AS NEEDED
Status: DISCONTINUED | OUTPATIENT
Start: 2022-08-08 | End: 2022-08-08 | Stop reason: HOSPADM

## 2022-08-08 RX ORDER — SODIUM CHLORIDE 0.9 % (FLUSH) 0.9 %
5-40 SYRINGE (ML) INJECTION EVERY 8 HOURS
Status: DISCONTINUED | OUTPATIENT
Start: 2022-08-08 | End: 2022-08-08 | Stop reason: HOSPADM

## 2022-08-08 RX ORDER — SODIUM CHLORIDE 0.9 % (FLUSH) 0.9 %
5-40 SYRINGE (ML) INJECTION AS NEEDED
Status: DISCONTINUED | OUTPATIENT
Start: 2022-08-08 | End: 2022-08-08 | Stop reason: HOSPADM

## 2022-08-08 RX ORDER — ONDANSETRON 2 MG/ML
4 INJECTION INTRAMUSCULAR; INTRAVENOUS
Status: DISCONTINUED | OUTPATIENT
Start: 2022-08-08 | End: 2022-08-08 | Stop reason: HOSPADM

## 2022-08-08 RX ORDER — HYDROCODONE BITARTRATE AND ACETAMINOPHEN 5; 325 MG/1; MG/1
1 TABLET ORAL
Status: DISCONTINUED | OUTPATIENT
Start: 2022-08-08 | End: 2022-08-08 | Stop reason: HOSPADM

## 2022-08-08 RX ADMIN — ACETAMINOPHEN 650 MG: 325 TABLET ORAL at 14:43

## 2022-08-08 NOTE — PROCEDURES
Cardiac Procedure Note   Patient: Surekha Lr  MRN: [id number]  SSN: [id number]   YOB: 1940 Age: 79 y.o.   Sex: male    Date of Procedure: 8/8/2022   Pre-procedure Diagnosis: bradycardia with second degree av block  Post-procedure Diagnosis: same  Procedure: Permanent dual chamber Pacemaker Insertion  :  Dr. Francia Villa MD    Assistant(s):  None  Anesthesia: Moderate Sedation   Estimated Blood Loss: Less than 30 mL due to back bleeding    Specimens Removed: None  Findings: RAA lead  RV basal septal lead  Complications: None   Implants dual chamber [employer] pacer  Signed by:  Francia Villa MD  8/8/2022  1:06 PM

## 2022-08-08 NOTE — PROGRESS NOTES
TRANSFER - IN REPORT:    Verbal report received from Kreeda Games on Meenu Handley  being received from procedure for routine progression of care. Report consisted of patients Situation, Background, Assessment and Recommendations(SBAR). Information from the following report(s) Procedure Summary, MAR, Accordion, Recent Results, and Med Rec Status was reviewed with the receiving clinician. Opportunity for questions and clarification was provided. Assessment completed upon patients arrival to [address] [place] [address] and care assumed. Cardiac Cath Lab Recovery Arrival Note:    Meenu Handley arrived to [facility] recovery area. Patient procedure= PPI   DDDR . Patient on cardiac monitor, non-invasive blood pressure, SPO2 monitor. On O2 @ 2 lpm via n/c. IV  of Nacl on pump at 25 ml/hr. Patient status doing well without problems. Patient is A&Ox 4. Patient reports no complaints. PROCEDURE SITE CHECK:    Procedure site:without any bleeding and or hematoma, no pain/discomfort reported at procedure site. No change in patient status. Continue to monitor patient and status. Ice bag to left chest wall    1345 PCXR completed  Tolerated food and drink.   D/c instructions reviewed with pt

## 2022-08-08 NOTE — Clinical Note
Cardiac Cath Lab Procedure Area Arrival Note:    Tang Kurtz arrived to Cardiac Cath Lab, Procedure Area. Patient identifiers verified with NAME and DATE OF BIRTH. Procedure verified with patient. Consent forms verified. Allergies verified. Patient informed of procedure and plan of care. Questions answered with review. Patient voiced understanding of procedure and plan of care. Patient on cardiac monitor, non-invasive blood pressure, SPO2 monitor. On RA to O2 @ 2 lpm via NC.  IV of  ml on pump at Orren Many ml/hr. Patient status doing well without problems. Patient is A&Ox 4. Patient reports no complaints. Patient medicated during procedure with orders obtained and verified by Dr. Mike Hester. Refer to patients Cardiac Cath Lab PROCEDURE REPORT for vital signs, assessment, status, and response during procedure, printed at end of case. Printed report on chart or scanned into chart. Cardiac Cath Lab Procedure Area Arrival Note:    Tang Kurtz arrived to Cardiac Cath Lab, Procedure Area. Patient identifiers verified with NAME and DATE OF BIRTH. Procedure verified with patient. Consent forms verified. Allergies verified. Patient informed of procedure and plan of care. Questions answered with review. Patient voiced understanding of procedure and plan of care. Patient on cardiac monitor, non-invasive blood pressure, SPO2 monitor. On RA O2 @ 2 lpm via NC.  IV of  ml on pump at Orren Many ml/hr. Patient status doing well without problems. Patient is A&Ox 4. Patient reports . Patient medicated during procedure with orders obtained and verified by Dr. Mike Hester. Refer to patients Cardiac Cath Lab PROCEDURE REPORT for vital signs, assessment, status, and response during procedure, printed at end of case. Printed report on chart or scanned into chart.

## 2022-08-08 NOTE — INTERVAL H&P NOTE
Update History & Physical    The Patient's History and Physical of July 26, 2022 was reviewed with the patient and I examined the patient. There was no change. The surgical site was confirmed by the patient and me. Plan:  The risk, benefits, expected outcome, and alternative to the recommended procedure have been discussed with the patient. Patient understands and wants to proceed with the procedure.     Electronically signed by Quintin Chan MD on 8/8/2022 at 11:52 AM

## 2022-08-08 NOTE — PROGRESS NOTES
Cardiac Cath Lab Recovery Arrival Note:      Chanelle Leblanc arrived to Cardiac Cath Lab, Recovery Area. Staff introduced to patient. Patient identifiers verified with NAME and DATE OF BIRTH. Procedure verified with patient. Consent forms reviewed and signed by patient or authorized representative and verified. Allergies verified. Patient and family oriented to department. Patient and family informed of procedure and plan of care. Questions answered with review. Patient prepped for procedure, per orders from physician, prior to arrival.    Patient on cardiac monitor, non-invasive blood pressure, SPO2 monitor. On RA. Patient is A&Ox 4. Patient reports no complaints. Patient in stretcher, in low position, with side rails up, call bell within reach, patient instructed to call if assistance as needed. Patient prep in: [facility] Recovery Area, [facility].    Patient family has pager # phone [phone]  Family in: hospital.   Prep by: PHILL

## 2022-08-08 NOTE — DISCHARGE INSTRUCTIONS
PATIENT INSTRUCTIONS POST-PACEMAKER IMPLANT    1. No heavy lifting or exercises with the left arm for 4 weeks. This includes the following:  Do not raise arm above the shoulder level to comb hair, pull on clothes, etc... Do not use the affected arm to pull up or push up from a seated or laying  down position. Do not allow anyone else to pull on the affected arm. 2.  You may shower with your Aquacel chest dressing in place. You may remove your chest dressing in 1 week, or it can be removed in clinic at follow up if you prefer. 3.  Do not drive for 3 days    4. Call Dr. Jade Maravilla at [phone] if you experience any of the following symptoms:  1. Redness at the pacemaker site  2. Swelling at or around the pacemaker or in the left arm  3. Pain around the pacemaker  4. Dizziness, lightheadedness, fainting spells  5. Lack of energy  6. Shortness of breath  7. Rapid heart rate  8. Chest or muscle twitches    5. Follow-up with Dr. Fran Jay office as scheduled below. Future Appointments   Date Time Provider Christiano Garcia   8/19/2022 10:20 AM PACEMAKER3, [place] [facility]   8/19/2022 10:30 AM WOUND CHECKS, [facility] AMB     6. You may use over the counter pain medication (Tylenol) and ice pack for pain relief as needed. You may wear the sling as a reminder to keep your arm below the your shoulder. Chrissy Lane M.D.  [facility]  Electrophysiology/Cardiology  [facility] [place]  [facility], [address]  [phone]

## 2022-08-08 NOTE — PROGRESS NOTES
Discharge instructions reviewed with son Dean Lori)   Reviewed with pt, teach back method used    Discharged via w/c with son, instructions, ice bag, sling to left arm and belongings  Aware need to set up his cell phone when he gets home to monitor his PPI.

## 2022-08-09 VITALS
OXYGEN SATURATION: 96 % | SYSTOLIC BLOOD PRESSURE: 120 MMHG | TEMPERATURE: 97.8 F | HEART RATE: 56 BPM | RESPIRATION RATE: 20 BRPM | DIASTOLIC BLOOD PRESSURE: 82 MMHG

## 2022-08-09 LAB
ATRIAL RATE: 63 BPM
BASOPHILS # BLD: 0 K/UL (ref 0–0.1)
BASOPHILS NFR BLD: 0 % (ref 0–1)
CALCULATED P AXIS, ECG09: 74 DEGREES
CALCULATED R AXIS, ECG10: 91 DEGREES
CALCULATED T AXIS, ECG11: 123 DEGREES
DIAGNOSIS, 93000: NORMAL
DIFFERENTIAL METHOD BLD: ABNORMAL
EOSINOPHIL # BLD: 0 K/UL (ref 0–0.4)
EOSINOPHIL NFR BLD: 0 % (ref 0–7)
ERYTHROCYTE [DISTWIDTH] IN BLOOD BY AUTOMATED COUNT: 13.2 % (ref 11.5–14.5)
HCT VFR BLD AUTO: 42.7 % (ref 36.6–50.3)
HGB BLD-MCNC: 14.4 G/DL (ref 12.1–17)
IMM GRANULOCYTES # BLD AUTO: 0 K/UL (ref 0–0.04)
IMM GRANULOCYTES NFR BLD AUTO: 0 % (ref 0–0.5)
LYMPHOCYTES # BLD: 0.4 K/UL (ref 0.8–3.5)
LYMPHOCYTES NFR BLD: 4 % (ref 12–49)
MCH RBC QN AUTO: 31.4 PG (ref 26–34)
MCHC RBC AUTO-ENTMCNC: 33.7 G/DL (ref 30–36.5)
MCV RBC AUTO: 93 FL (ref 80–99)
MONOCYTES # BLD: 0.7 K/UL (ref 0–1)
MONOCYTES NFR BLD: 7 % (ref 5–13)
NEUTS SEG # BLD: 8.5 K/UL (ref 1.8–8)
NEUTS SEG NFR BLD: 89 % (ref 32–75)
NRBC # BLD: 0 K/UL (ref 0–0.01)
NRBC BLD-RTO: 0 PER 100 WBC
P-R INTERVAL, ECG05: 178 MS
PLATELET # BLD AUTO: 164 K/UL (ref 150–400)
PMV BLD AUTO: 10.4 FL (ref 8.9–12.9)
Q-T INTERVAL, ECG07: 486 MS
QRS DURATION, ECG06: 172 MS
QTC CALCULATION (BEZET), ECG08: 497 MS
RBC # BLD AUTO: 4.59 M/UL (ref 4.1–5.7)
RBC MORPH BLD: ABNORMAL
TROPONIN-HIGH SENSITIVITY: 42 NG/L (ref 0–76)
TROPONIN-HIGH SENSITIVITY: 60 NG/L (ref 0–76)
VENTRICULAR RATE, ECG03: 63 BPM
WBC # BLD AUTO: 9.6 K/UL (ref 4.1–11.1)

## 2022-08-09 PROCEDURE — 36415 COLL VENOUS BLD VENIPUNCTURE: CPT

## 2022-08-09 RX ORDER — ONDANSETRON 4 MG/1
4 TABLET, ORALLY DISINTEGRATING ORAL
Qty: 30 TABLET | Refills: 0 | Status: SHIPPED | OUTPATIENT
Start: 2022-08-09

## 2022-08-09 NOTE — ED TRIAGE NOTES
Triage: Pt arrives ambulatory from home with CC of general malaise, diarrhea, and pain all the way from his pelvis to his chest when he breathes. Pt had a pacemaker implanted today. He reports after the procedure he went home and took about an hour nap and woke up diaphoretic, pale and feeling ill.

## 2022-08-09 NOTE — ED PROVIDER NOTES
65-year-old male presenting with feeling fatigued drowsy and unwell episode of dizziness since lightheadedness. Patient status post pacemaker placement earlier today recently sedation. Patient reports he is doing well till when he was at home when he started feeling unwell and slightly nauseous. Reported that he had to go the bathroom and so diarrhea followed by worsening nausea cramping dizziness and sweatiness. After presenting to the ER and waiting to be seen patient reports symptoms are resolved. Patient denying any current symptoms. Denies any loss of consciousness. Had episode reported some abdominal cramping ability to radiate up to his chest.  No numbness tingling or weakness in the extremities. Past Medical History:   Diagnosis Date    CAD (coronary artery disease)     High cholesterol     Hypertension     Irregular heart beat     Pneumonia        Past Surgical History:   Procedure Laterality Date    HX HERNIA REPAIR      X2    HX KNEE REPLACEMENT      bilateral         No family history on file. Social History     Socioeconomic History    Marital status:      Spouse name: Not on file    Number of children: Not on file    Years of education: Not on file    Highest education level: Not on file   Occupational History    Not on file   Tobacco Use    Smoking status: Never    Smokeless tobacco: Never   Substance and Sexual Activity    Alcohol use: Yes     Comment: one a week    Drug use: No    Sexual activity: Not on file   Other Topics Concern    Not on file   Social History Narrative    Not on file     Social Determinants of Health     Financial Resource Strain: Not on file   Food Insecurity: Not on file   Transportation Needs: Not on file   Physical Activity: Not on file   Stress: Not on file   Social Connections: Not on file   Intimate Partner Violence: Not on file   Housing Stability: Not on file         ALLERGIES: Patient has no known allergies.     Review of Systems Constitutional:  Positive for diaphoresis. Negative for chills and fever. HENT:  Negative for congestion and sore throat. Eyes:  Negative for pain. Respiratory:  Negative for shortness of breath. Cardiovascular:  Positive for chest pain. Gastrointestinal:  Positive for diarrhea and nausea. Negative for abdominal pain and vomiting. Genitourinary:  Negative for dysuria and flank pain. Musculoskeletal:  Negative for back pain and neck pain. Skin:  Negative for rash. Neurological:  Positive for dizziness and light-headedness. Negative for headaches. All other systems reviewed and are negative. Vitals:    08/09/22 0318   BP: 120/82   Pulse: (!) 56   Resp: 20   Temp: 97.8 °F (36.6 °C)   SpO2: 96%            Physical Exam  Constitutional:       Appearance: He is well-developed. HENT:      Head: Normocephalic. Eyes:      Conjunctiva/sclera: Conjunctivae normal.   Cardiovascular:      Rate and Rhythm: Normal rate and regular rhythm. Pulmonary:      Effort: Pulmonary effort is normal. No respiratory distress. Breath sounds: Normal breath sounds. Abdominal:      General: Bowel sounds are normal.      Palpations: Abdomen is soft. Tenderness: There is no abdominal tenderness. Musculoskeletal:         General: Normal range of motion. Cervical back: Normal range of motion and neck supple. Skin:     General: Skin is warm. Capillary Refill: Capillary refill takes less than 2 seconds. Findings: No rash. Neurological:      Mental Status: He is alert and oriented to person, place, and time. Comments: No gross motor or sensory deficits        MDM  Number of Diagnoses or Management Options  Adverse effect of drug, initial encounter  Vasovagal near syncope  Diagnosis management comments: Patient status post pacemaker placement earlier today received sedation. Later and had an episode that seem most consistent with vasovagal near syncope. EKG showing a paced rhythm. Troponin trending downward no significant lab or electrolyte abnormalities. Patient symptoms most consistent with vasovagal near syncope and component of medication adverse effect as result of the sedation. However patient is currently asymptomatic and well-appearing. Normal vital signs. Amount and/or Complexity of Data Reviewed  Clinical lab tests: reviewed  Tests in the radiology section of CPT®: reviewed  Tests in the medicine section of CPT®: reviewed      ED Course as of 08/09/22 2211   [facility] Aug 08, 2022   2323 EKG AV dual paced rhythm rate of 63 bpm wide-complex QRS. No signs of ischemia. EKG interpreted by Branden Marcelino MD   [ZD]      ED Course User Index  [ZD] Ian Cifuentes MD       Procedures               Recent Results (from the past 24 hour(s))   TROPONIN-HIGH SENSITIVITY    Collection Time: 08/09/22  2:07 AM   Result Value Ref Range    Troponin-High Sensitivity 42 0 - 76 ng/L       XR CHEST PA LAT    Result Date: 8/8/2022  EXAM:  XR CHEST PA LAT INDICATION: Chest pain COMPARISON: 8/8/2022 at 1347 hours TECHNIQUE: PA and lateral chest views FINDINGS: The left chest ICD and wires are stable. The cardiomediastinal contours are stable. The pulmonary vasculature is within normal limits. There are improved lung volumes with decreased bibasilar opacities. There is a stable trace left pleural effusion. There is no pneumothorax. The bones and upper abdomen are stable. Stable trace left pleural effusion. Improved lung volumes with decreased bibasilar opacities.

## 2022-08-18 NOTE — PROGRESS NOTES
Cardiac Electrophysiology Wound Check Note      Wound Check   Patient is here for wound check s/p dual chamber PPM.   No fever, drainage   Physical Exam   Constitutional: well-developed and well-nourished. Skin: Left side pocket is healing without redness, drainage, hematoma. The wound is intact. ASSESSMENT and PLAN   The incision is healing without redness, drainage, hematoma. The patient has been compliant with arm restrictions. They will continue arms restrictions for 2 more weeks.   current treatment plan is effective, no change in therapy   Device check shows proper lead and generator functions    Follow-up Disposition:  Return 3 months I will check via device clinic or remote monitoring in the future

## 2022-08-19 ENCOUNTER — CLINICAL SUPPORT (OUTPATIENT)
Dept: CARDIOLOGY CLINIC | Age: 82
End: 2022-08-19

## 2022-08-19 ENCOUNTER — CLINICAL SUPPORT (OUTPATIENT)
Dept: CARDIOLOGY CLINIC | Age: 82
End: 2022-08-19
Payer: MEDICARE

## 2022-08-19 ENCOUNTER — HOSPITAL ENCOUNTER (OUTPATIENT)
Dept: GENERAL RADIOLOGY | Age: 82
Discharge: HOME | End: 2022-08-19
Attending: NURSE PRACTITIONER
Payer: MEDICARE

## 2022-08-19 DIAGNOSIS — Z95.0 CARDIAC PACEMAKER IN SITU: ICD-10-CM

## 2022-08-19 DIAGNOSIS — Z95.0 CARDIAC PACEMAKER IN SITU: Primary | ICD-10-CM

## 2022-08-19 PROCEDURE — 93288 INTERROG EVL PM/LDLS PM IP: CPT | Performed by: INTERNAL MEDICINE

## 2022-08-19 PROCEDURE — 71046 X-RAY EXAM CHEST 2 VIEWS: CPT

## 2022-08-19 NOTE — PROGRESS NOTES
Chargeable DC PM scheduled visit. Device functioning appropriately as programmed. However RA threshold was 3.75V @ 1.0ms, pt. Advised to go get an xray. 89.9% AP, 99.5% RVP. Patient presents for wound check post-device implantation. The dressing was removed and the site was inspected. The site appeared to be well-healing without ecchymosis/tenderness/erythema. Denies pain, fevers, discharge. Continue follow up in device clinic as planned. See scanned documents in media manger.

## 2022-08-24 ENCOUNTER — TELEPHONE (OUTPATIENT)
Dept: CARDIOLOGY CLINIC | Age: 82
End: 2022-08-24

## 2022-08-24 NOTE — TELEPHONE ENCOUNTER
Verified patient with two types of identifiers. Notified of results and MD recommendations. Patient verbalized understanding and will call with any other questions.       Future Appointments   Date Time Provider Christiano Garcia   11/22/2022  2:20 PM Meme POTTS AMB   11/22/2022  2:30 PM BURAK Lewis AMB   2/24/2023  8:40 AM ARGENTINA LANDIS AMB

## 2022-08-24 NOTE — TELEPHONE ENCOUNTER
----- Message from Lny Anderson MD sent at 8/24/2022  1:32 PM EDT -----  Regarding: RE: CXR, possible lead dislodgment  Orientation of the lead is different but not dislodged  ----- Message -----  From: Yaz Gotti NP  Sent: 8/24/2022   9:06 AM EDT  To: Lyn Anderson MD, June Ashraf RN  Subject: CXR, possible lead dislodgment                   Please review recent CXR, had change in threshold of RA lead. It won't let me view the actual images. Does he need lead repositioning?

## 2022-08-25 ENCOUNTER — DOCUMENTATION ONLY (OUTPATIENT)
Dept: CARDIOLOGY CLINIC | Age: 82
End: 2022-08-25

## 2022-08-25 ENCOUNTER — TELEPHONE (OUTPATIENT)
Dept: CARDIOLOGY CLINIC | Age: 82
End: 2022-08-25

## 2022-08-25 NOTE — LETTER
8/25/2022 10:54 AM    Mr. Antonio Batista  [address] [address]      Your Right Atrial Lead Reposition procedure has been scheduled for 10/19/22 at 11:00 am, at [facility].    Please report to Admitting Department by 9:00 am, or 2 hours prior to your scheduled procedure. Please bring a list of your current medications and medication bottles, if able, to the hospital on this day. You will be unable to drive after your procedure so please make sure to bring someone with you to your procedure. You will need to have nothing to eat or drink after midnight, the night prior to your procedure. You may have small sips of water, if needed, to take with your medication. You will also need to see Dr. Amanda Vaughn Nurse Practitioner, Mallika Michelle, in office prior to your procedure. An appointment has been scheduled for 10/10/22 at 1:30 pm.    You will need labs drawn prior to your procedure. Please plan to have these drawn at your appointment with Gasper Chen NP. You should not stop your medication prior to your scheduled procedure. After your procedure, you will need to follow up with Dr. Amanda Vaughn nurse for a wound and device check. Your follow-up appointment has been scheduled for 10/28/22 at 9:00 am.     Hibiclens 4% topical solution   Patient it start Hibiclens application 5 days prior to procedure date    Directions Hibiclens 4%: Start cleanse 5 days prior to procedure   1. Rinse area (upper chest and upper arms) with water. 2. Apply minimum amount necessary to scrub the upper chest area from shoulder/neck to mid line of chest and to below the nipple each of  5 nights before the day of the procedure  3. Let solution dry.          Sincerely,      Zeina Sun MD

## 2022-08-25 NOTE — TELEPHONE ENCOUNTER
Per Dr. Yan [facility] \"I reviewed recent CXR, had change in threshold of RA lead to 3.5-4 V @ 1 ms and orientation of RA lead on xray has changed but still in RA. He is RA and RV pacing dependent so battery is estimated 3.5 years with output of RA 5 V @ 1 m  I recommend him have RA lead repositioning otherwise gen change will be in 3 years. \"    Verified patient with two types of identifiers. Notified that after reviewing device check he now recommends RA lead reposition. Patient is agreeable to schedule RA lead reposition. Will schedule for 10/19/22 at 11:00 am. Will mail pre procedure instructions. Patient verbalized understanding and will call with any other questions.       Future Appointments   Date Time Provider Christiano Garcia   10/10/2022  1:30 PM BURAK Ambriz AMB   10/28/2022  9:00 AM PACEMAKER3, ARGENTINA POTTS AMB   10/28/2022  9:30 AM WOUND CHECKS, ARGENTINA POTTS AMB   2/24/2023  8:40 AM REMOTE1, ARGENTINA POTTS AMB

## 2022-08-25 NOTE — PROGRESS NOTES
I reviewed recent CXR, had change in threshold of RA lead to 3.5-4 V @ 1 ms and orientation of RA lead on xray has changed but still in RA.    He is RA and RV pacing dependent so battery is estimated 3.5 years with output of RA 5 V @ 1 m  I recommend him have RA lead repositioning otherwise gen change will be in 3 years

## 2022-09-02 ENCOUNTER — APPOINTMENT (OUTPATIENT)
Dept: VASCULAR SURGERY | Age: 82
End: 2022-09-02
Attending: NURSE PRACTITIONER
Payer: MEDICARE

## 2022-09-02 ENCOUNTER — HOSPITAL ENCOUNTER (EMERGENCY)
Age: 82
Discharge: HOME | End: 2022-09-03
Attending: EMERGENCY MEDICINE
Payer: MEDICARE

## 2022-09-02 ENCOUNTER — APPOINTMENT (OUTPATIENT)
Dept: GENERAL RADIOLOGY | Age: 82
End: 2022-09-02
Attending: EMERGENCY MEDICINE
Payer: MEDICARE

## 2022-09-02 DIAGNOSIS — R60.0 BILATERAL LEG EDEMA: Primary | ICD-10-CM

## 2022-09-02 LAB
COMMENT, HOLDF: NORMAL
PHOSPHATE SERPL-MCNC: 2.8 MG/DL (ref 2.6–4.7)
SAMPLES BEING HELD,HOLD: NORMAL

## 2022-09-02 PROCEDURE — 36415 COLL VENOUS BLD VENIPUNCTURE: CPT

## 2022-09-02 PROCEDURE — 93005 ELECTROCARDIOGRAM TRACING: CPT

## 2022-09-02 PROCEDURE — 83735 ASSAY OF MAGNESIUM: CPT

## 2022-09-02 PROCEDURE — 71046 X-RAY EXAM CHEST 2 VIEWS: CPT

## 2022-09-02 PROCEDURE — 84484 ASSAY OF TROPONIN QUANT: CPT

## 2022-09-02 PROCEDURE — 99285 EMERGENCY DEPT VISIT HI MDM: CPT

## 2022-09-02 PROCEDURE — 80053 COMPREHEN METABOLIC PANEL: CPT

## 2022-09-02 PROCEDURE — 83880 ASSAY OF NATRIURETIC PEPTIDE: CPT

## 2022-09-02 PROCEDURE — 93970 EXTREMITY STUDY: CPT

## 2022-09-02 PROCEDURE — 85025 COMPLETE CBC W/AUTO DIFF WBC: CPT

## 2022-09-02 PROCEDURE — 84100 ASSAY OF PHOSPHORUS: CPT

## 2022-09-03 VITALS
OXYGEN SATURATION: 95 % | DIASTOLIC BLOOD PRESSURE: 91 MMHG | TEMPERATURE: 97.9 F | SYSTOLIC BLOOD PRESSURE: 157 MMHG | RESPIRATION RATE: 21 BRPM | WEIGHT: 191 LBS | HEART RATE: 58 BPM | BODY MASS INDEX: 27.35 KG/M2 | HEIGHT: 70 IN

## 2022-09-03 LAB
ALBUMIN SERPL-MCNC: 3.4 G/DL (ref 3.5–5)
ALBUMIN/GLOB SERPL: 1 {RATIO} (ref 1.1–2.2)
ALP SERPL-CCNC: 103 U/L (ref 45–117)
ALT SERPL-CCNC: 26 U/L (ref 12–78)
ANION GAP SERPL CALC-SCNC: 7 MMOL/L (ref 5–15)
AST SERPL-CCNC: 15 U/L (ref 15–37)
BASOPHILS # BLD: 0.1 K/UL (ref 0–0.1)
BASOPHILS NFR BLD: 1 % (ref 0–1)
BILIRUB SERPL-MCNC: 0.7 MG/DL (ref 0.2–1)
BNP SERPL-MCNC: 404 PG/ML
BUN SERPL-MCNC: 19 MG/DL (ref 6–20)
BUN/CREAT SERPL: 22 (ref 12–20)
CALCIUM SERPL-MCNC: 8.7 MG/DL (ref 8.5–10.1)
CHLORIDE SERPL-SCNC: 110 MMOL/L (ref 97–108)
CO2 SERPL-SCNC: 26 MMOL/L (ref 21–32)
CREAT SERPL-MCNC: 0.88 MG/DL (ref 0.7–1.3)
DIFFERENTIAL METHOD BLD: ABNORMAL
EOSINOPHIL # BLD: 0.3 K/UL (ref 0–0.4)
EOSINOPHIL NFR BLD: 5 % (ref 0–7)
ERYTHROCYTE [DISTWIDTH] IN BLOOD BY AUTOMATED COUNT: 13.2 % (ref 11.5–14.5)
GLOBULIN SER CALC-MCNC: 3.5 G/DL (ref 2–4)
GLUCOSE SERPL-MCNC: 122 MG/DL (ref 65–100)
HCT VFR BLD AUTO: 34.9 % (ref 36.6–50.3)
HGB BLD-MCNC: 11.6 G/DL (ref 12.1–17)
IMM GRANULOCYTES # BLD AUTO: 0.1 K/UL (ref 0–0.04)
IMM GRANULOCYTES NFR BLD AUTO: 1 % (ref 0–0.5)
LYMPHOCYTES # BLD: 0.6 K/UL (ref 0.8–3.5)
LYMPHOCYTES NFR BLD: 9 % (ref 12–49)
MAGNESIUM SERPL-MCNC: 2.1 MG/DL (ref 1.6–2.4)
MCH RBC QN AUTO: 30.6 PG (ref 26–34)
MCHC RBC AUTO-ENTMCNC: 33.2 G/DL (ref 30–36.5)
MCV RBC AUTO: 92.1 FL (ref 80–99)
MONOCYTES # BLD: 0.7 K/UL (ref 0–1)
MONOCYTES NFR BLD: 11 % (ref 5–13)
NEUTS SEG # BLD: 4.8 K/UL (ref 1.8–8)
NEUTS SEG NFR BLD: 73 % (ref 32–75)
NRBC # BLD: 0 K/UL (ref 0–0.01)
NRBC BLD-RTO: 0 PER 100 WBC
PLATELET # BLD AUTO: 186 K/UL (ref 150–400)
PMV BLD AUTO: 9.5 FL (ref 8.9–12.9)
POTASSIUM SERPL-SCNC: 4.1 MMOL/L (ref 3.5–5.1)
PROT SERPL-MCNC: 6.9 G/DL (ref 6.4–8.2)
RBC # BLD AUTO: 3.79 M/UL (ref 4.1–5.7)
RBC MORPH BLD: ABNORMAL
SODIUM SERPL-SCNC: 143 MMOL/L (ref 136–145)
TROPONIN-HIGH SENSITIVITY: 14 NG/L (ref 0–76)
WBC # BLD AUTO: 6.6 K/UL (ref 4.1–11.1)

## 2022-09-03 NOTE — ED TRIAGE NOTES
Patient arrives ambulatory to ED with complaints of  bilateral leg swelling and tingling     Had pacemaker placed a few weeks ago    Reports 3 lb weight gain

## 2022-09-03 NOTE — ED PROVIDER NOTES
65-year-old male with a past medical history significant for coronary disease, hypercholesterolemia, hypertension, arrhythmia status post pacemaker placement 1 month ago, bilateral knee replacement and hernia repair x2 who presents to the ER with a complaint of generalized swelling to both legs over the last 3 days. The patient's symptoms have worsened since pacemaker placement 3 weeks ago. The patient does also concern of intermittent episodes of shortness of breath especially with exertion. This symptom began way before pacemaker was placed and have not gotten much better. The patient was initially taken diuretic however stopped taking the medication approximately 2 to 4 days ago at the request of her doctor. Patient denies any nausea or vomiting, diarrhea constipation, headache, neck or back pain, dysuria, dizziness, extremity weakness or numbness and recent travel. Past Medical History:   Diagnosis Date    CAD (coronary artery disease)     High cholesterol     Hypertension     Irregular heart beat     Pneumonia        Past Surgical History:   Procedure Laterality Date    HX HERNIA REPAIR      X2    HX KNEE REPLACEMENT      bilateral         No family history on file.     Social History     Socioeconomic History    Marital status:      Spouse name: Not on file    Number of children: Not on file    Years of education: Not on file    Highest education level: Not on file   Occupational History    Not on file   Tobacco Use    Smoking status: Never    Smokeless tobacco: Never   Substance and Sexual Activity    Alcohol use: Yes     Comment: one a week    Drug use: No    Sexual activity: Not on file   Other Topics Concern    Not on file   Social History Narrative    Not on file     Social Determinants of Health     Financial Resource Strain: Not on file   Food Insecurity: Not on file   Transportation Needs: Not on file   Physical Activity: Not on file   Stress: Not on file   Social Connections: Not on file   Intimate Partner Violence: Not on file   Housing Stability: Not on file         ALLERGIES: Codeine    Review of Systems   All other systems reviewed and are negative. Vitals:    09/02/22 2044 09/03/22 0038 09/03/22 0100   BP:  (!) 153/90 (!) 157/91   Pulse: 63 61 (!) 58   Resp: 19 23 21   Temp: 97.9 °F (36.6 °C)     SpO2: 96% 98% 95%   Weight: 86.6 kg (191 lb)     Height: 5' 10\" (1.778 m)              Physical Exam  Vitals and nursing note reviewed. Exam conducted with a chaperone present. CONSTITUTIONAL: Well-appearing; well-nourished; in no apparent distress  HEAD: Normocephalic; atraumatic  EYES: PERRL; EOM intact; conjunctiva and sclera are clear bilaterally. ENT: No rhinorrhea; normal pharynx with no tonsillar hypertrophy; mucous membranes pink/moist, no erythema, no exudate. NECK: Supple; non-tender; no cervical lymphadenopathy  CARD: Normal S1, S2; no murmurs, rubs, or gallops. Regular rate and rhythm. RESP: Normal respiratory effort; breath sounds clear and equal bilaterally; no wheezes, rhonchi, or rales. ABD: Normal bowel sounds; non-distended; non-tender; no palpable organomegaly, no masses, no bruits. Back Exam: Normal inspection; no vertebral point tenderness, no CVA tenderness. Normal range of motion. EXT: Normal ROM in all four extremities; non-tender to palpation; no swelling or deformity; distal pulses are normal, no edema. SKIN: Warm; dry; no rash. NEURO:Alert and oriented x 3, coherent, LUANNE-XII grossly intact, sensory and motor are non-focal.        MDM         Procedures    ED EKG interpretation:  Rhythm: paced; and regular . Rate (approx.): 115; Axis: normal; ; Other findings: abnormal ekg. This EKG was interpreted by Digna Hayward MD,ED Provider. XRAY INTERPRETATION (ED MD)  Chest Xray  No acute process seen. Normal heart size. No bony abnormalities. No infiltrate.   Wilmer Chang MD 1:51 AM     Progress Note:   Pt has been reexamined by Digna Hayward MD. Pt is feeling much better. Symptoms have improved. All available results have been reviewed with pt and any available family. Pt understands sx, dx, and tx in ED. Care plan has been outlined and questions have been answered. Pt is ready to go home. Will send home on leg edema instruction. . Outpatient referral with PCP and cardiologist for reevaluation and further treatment as needed. Written by Radha Miguel MD,1:51 AM    .   .

## 2022-09-04 LAB
ATRIAL RATE: 70 BPM
CALCULATED P AXIS, ECG09: 67 DEGREES
CALCULATED R AXIS, ECG10: 92 DEGREES
CALCULATED T AXIS, ECG11: 103 DEGREES
DIAGNOSIS, 93000: NORMAL
Q-T INTERVAL, ECG07: 358 MS
QRS DURATION, ECG06: 38 MS
QTC CALCULATION (BEZET), ECG08: 495 MS
VENTRICULAR RATE, ECG03: 115 BPM

## 2022-10-07 NOTE — H&P (VIEW-ONLY)
Cardiac Electrophysiology OFFICE Note     Subjective:      Cindi Montes is a 79 y.o. patient who presents for H&P update prior to upcoming planned RA lead repositioning scheduled for 10/19/2022, is s/p Medtronic dual chamber pacemaker implant (DOI 2022). He had recent change in threshold of RA lead to [email or web address]ase Orientation of RA lead appears to have changed on CXR, but still in RA. RA & RV pacing dependent, has only 3.5 years remaining on generator with current threshold. LVEF normal on stress echo in 2021. BP controlled. States he recently had an echo & nuclear stress test done by Dr. Hannah Quintana for chest discomfort with bilateral arm paresthesias. Previous:  Medtronic dual chamber pacemaker (DOI 2022). TSH 1.84 in 2021. Dr. Hannah Quintana is primary cardiologist.    Father  age 66, had hemachromatosis. Mother  age 80. Brother with pacemaker. Problem List  Date Reviewed: 10/10/2022            Codes Class Noted    Pacemaker ICD-10-CM: Z95.0  ICD-9-CM: V45.01  2022    Overview Signed 2022 11:52 AM by Kailash Garcia MD     22 dual chamber Medtronic             Sinus bradycardia ICD-10-CM: R00.1  ICD-9-CM: 427.89  2022        1st degree AV block ICD-10-CM: I44.0  ICD-9-CM: 426.11  2022        2nd degree AV block ICD-10-CM: I44.1  ICD-9-CM: 426.13  2022        RBBB ICD-10-CM: I45.10  ICD-9-CM: 426.4  2022           Current Outpatient Medications   Medication Sig Dispense Refill    furosemide (LASIX) 20 mg tablet TAKE 1 TABLET BY MOUTH EVERY DAY FOR 30 DAYS      omeprazole (PRILOSEC) 20 mg capsule       ondansetron (ZOFRAN ODT) 4 mg disintegrating tablet Take 1 Tablet by mouth every eight (8) hours as needed for Nausea or Vomiting. 30 Tablet 0    losartan (COZAAR) 100 mg tablet Take 100 mg by mouth daily. Takes 1/2 tablet daily      meloxicam (MOBIC) 15 mg tablet Take 1 Tablet by mouth daily.       melatonin 5 mg cap capsule Take 5 mg by mouth nightly. aspirin delayed-release 81 mg tablet Take 81 mg by mouth daily. rosuvastatin (CRESTOR) 20 mg tablet Take 20 mg by mouth nightly. tamsulosin (FLOMAX) 0.4 mg capsule Take 0.4 mg by mouth daily. cholecalciferol (VITAMIN D3) 25 mcg (1,000 unit) cap Take 1,000 Units by mouth daily. doxycycline (VIBRA-TABS) 100 mg tablet Take 1 Tab by mouth two (2) times a day. 13 Tab 0    hydroCHLOROthiazide (MICROZIDE) 12.5 mg capsule Take 12.5 mg by mouth daily. (Patient not taking: Reported on 10/10/2022)       Allergies   Allergen Reactions    Codeine Nausea and Vomiting     Past Medical History:   Diagnosis Date    CAD (coronary artery disease)     High cholesterol     Hypertension     Irregular heart beat     Pneumonia      Past Surgical History:   Procedure Laterality Date    HX HERNIA REPAIR      X2    HX KNEE REPLACEMENT      bilateral     No family history on file. Social History     Tobacco Use    Smoking status: Never    Smokeless tobacco: Never   Substance Use Topics    Alcohol use: Yes     Comment: one a week        Review of Systems: Review of all other systems otherwise negative. Constitutional: Negative for fever, chills, weight loss, + malaise/fatigue. HEENT: Negative for nosebleeds, vision changes. Respiratory: Negative for cough, hemoptysis. Cardiovascular: Negative for chest pain, palpitations, orthopnea, claudication, + occasional leg swelling, no syncope, and PND. + MYERS. Gastrointestinal: Negative for nausea, vomiting, diarrhea, blood in stool and melena. Genitourinary: Negative for dysuria, and hematuria. Musculoskeletal: Negative for myalgias, arthralgia. Skin: Negative for rash. Heme: Does not bleed or bruise easily. Neurological: Negative for speech change and focal weakness.      Objective:     Visit Vitals  /60 (BP 1 Location: Left upper arm, BP Patient Position: Sitting, BP Cuff Size: Adult)   Pulse 83   Resp 18   Ht 5' 10\" (1.778 m)   Wt 174 lb 9.6 oz (79.2 kg)   SpO2 99%   BMI 25.05 kg/m²        Physical Exam:   Constitutional: Well-developed and well-nourished. No respiratory distress. Head: Normocephalic and atraumatic. Eyes: Pupils are equal, round. ENT: Hearing grossly normal.  Neck: Supple. No JVD present. Cardiovascular: Regular rate, regular rhythm. Exam reveals no gallop and no friction rub. No murmur heard. Pulmonary/Chest: Effort normal and breath sounds normal. No wheezes. Abdominal: Soft, no tenderness. Musculoskeletal: Moves extremities independently. Normal gait. Vasc/lymphatic: No edema. Neurological: Alert, oriented. Skin: Skin is warm and dry. Left chest pacemaker site well healed. Psychiatric: Normal mood and affect. Behavior is normal. Judgment and thought content normal.        Assessment/Plan:     Imaging/Studies:  Stress echo (11/23/2021): LVEF 55%. No ischemia. 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 Echo (02/07/2018): LVEF 50-55%. Mild to mod TR. ICD-10-CM ICD-9-CM    1. Cardiac pacemaker in situ  Z95.0 V45.01 CBC WITH AUTOMATED DIFF      METABOLIC PANEL, BASIC      2. Sinus kathi-tachy syndrome (HCC)  I49.5 427.81 CBC WITH AUTOMATED DIFF      METABOLIC PANEL, BASIC      3. Primary hypertension  I10 401.9 CBC WITH AUTOMATED DIFF      METABOLIC PANEL, BASIC          Medtronic dual chamber pacemaker (DOI 08/08/2022): RA lead threshold increased, has confirmed change in orientation of RA lead on CXR. Current settings will drain generator in 3.5 years. RA & RV pacer dependent. Reviewed risks/benefits of RA lead repositioning. He agrees to proceed as scheduled. Labs ordered. Hibiclens reviewed. States he had significant after effects post implant due to sedation, required ER visit. Will order MAC & request that Zofran is given prior to sedation. HTN: BP controlled. Continue current medication regimen.       Future Appointments   Date Time Provider Christiano Garcia 10/28/2022  9:00 AM [email or web address], ARGENTINA POTTS AMB   10/28/2022  9:30 AM WOUND CHECKS, ARGENTINA POTTS AMB   2/24/2023  8:40 AM REMOTE1, ARGENTINA POTTS AMB     Thank you for involving me in this patient's care and please call with further concerns or questions.     JEFFY XieJordon  [facility]  10/10/22

## 2022-10-07 NOTE — PROGRESS NOTES
Cardiac Electrophysiology OFFICE Note     Subjective:      Karen Riley is a 79 y.o. patient who presents for H&P update prior to upcoming planned RA lead repositioning scheduled for 10/19/2022, is s/p Medtronic dual chamber pacemaker implant (DOI 2022). He had recent change in threshold of RA lead to [email or web address] Orientation of RA lead appears to have changed on CXR, but still in RA. RA & RV pacing dependent, has only 3.5 years remaining on generator with current threshold. LVEF normal on stress echo in 2021. BP controlled. States he recently had an echo & nuclear stress test done by Dr. Akshat Bell for chest discomfort with bilateral arm paresthesias. Previous:  Medtronic dual chamber pacemaker (DOI 2022). TSH 1.84 in 2021. Dr. Akshat Bell is primary cardiologist.    Father  age 66, had hemachromatosis. Mother  age 80. Brother with pacemaker. Problem List  Date Reviewed: 10/10/2022            Codes Class Noted    Pacemaker ICD-10-CM: Z95.0  ICD-9-CM: V45.01  2022    Overview Signed 2022 11:52 AM by Gibson Contreras MD     22 dual chamber Medtronic             Sinus bradycardia ICD-10-CM: R00.1  ICD-9-CM: 427.89  2022        1st degree AV block ICD-10-CM: I44.0  ICD-9-CM: 426.11  2022        2nd degree AV block ICD-10-CM: I44.1  ICD-9-CM: 426.13  2022        RBBB ICD-10-CM: I45.10  ICD-9-CM: 426.4  2022           Current Outpatient Medications   Medication Sig Dispense Refill    furosemide (LASIX) 20 mg tablet TAKE 1 TABLET BY MOUTH EVERY DAY FOR 30 DAYS      omeprazole (PRILOSEC) 20 mg capsule       ondansetron (ZOFRAN ODT) 4 mg disintegrating tablet Take 1 Tablet by mouth every eight (8) hours as needed for Nausea or Vomiting. 30 Tablet 0    losartan (COZAAR) 100 mg tablet Take 100 mg by mouth daily. Takes 1/2 tablet daily      meloxicam (MOBIC) 15 mg tablet Take 1 Tablet by mouth daily.       melatonin 5 mg cap capsule Take 5 mg by mouth nightly. aspirin delayed-release 81 mg tablet Take 81 mg by mouth daily. rosuvastatin (CRESTOR) 20 mg tablet Take 20 mg by mouth nightly. tamsulosin (FLOMAX) 0.4 mg capsule Take 0.4 mg by mouth daily. cholecalciferol (VITAMIN D3) 25 mcg (1,000 unit) cap Take 1,000 Units by mouth daily. doxycycline (VIBRA-TABS) 100 mg tablet Take 1 Tab by mouth two (2) times a day. 13 Tab 0    hydroCHLOROthiazide (MICROZIDE) 12.5 mg capsule Take 12.5 mg by mouth daily. (Patient not taking: Reported on 10/10/2022)       Allergies   Allergen Reactions    Codeine Nausea and Vomiting     Past Medical History:   Diagnosis Date    CAD (coronary artery disease)     High cholesterol     Hypertension     Irregular heart beat     Pneumonia      Past Surgical History:   Procedure Laterality Date    HX HERNIA REPAIR      X2    HX KNEE REPLACEMENT      bilateral     No family history on file. Social History     Tobacco Use    Smoking status: Never    Smokeless tobacco: Never   Substance Use Topics    Alcohol use: Yes     Comment: one a week        Review of Systems: Review of all other systems otherwise negative. Constitutional: Negative for fever, chills, weight loss, + malaise/fatigue. HEENT: Negative for nosebleeds, vision changes. Respiratory: Negative for cough, hemoptysis. Cardiovascular: Negative for chest pain, palpitations, orthopnea, claudication, + occasional leg swelling, no syncope, and PND. + MYERS. Gastrointestinal: Negative for nausea, vomiting, diarrhea, blood in stool and melena. Genitourinary: Negative for dysuria, and hematuria. Musculoskeletal: Negative for myalgias, arthralgia. Skin: Negative for rash. Heme: Does not bleed or bruise easily. Neurological: Negative for speech change and focal weakness.      Objective:     Visit Vitals  /60 (BP 1 Location: Left upper arm, BP Patient Position: Sitting, BP Cuff Size: Adult)   Pulse 83   Resp 18   Ht 5' 10\" (1.778 m)   Wt 174 lb 9.6 oz (79.2 kg)   SpO2 99%   BMI 25.05 kg/m²        Physical Exam:   Constitutional: Well-developed and well-nourished. No respiratory distress. Head: Normocephalic and atraumatic. Eyes: Pupils are equal, round. ENT: Hearing grossly normal.  Neck: Supple. No JVD present. Cardiovascular: Regular rate, regular rhythm. Exam reveals no gallop and no friction rub. No murmur heard. Pulmonary/Chest: Effort normal and breath sounds normal. No wheezes. Abdominal: Soft, no tenderness. Musculoskeletal: Moves extremities independently. Normal gait. Vasc/lymphatic: No edema. Neurological: Alert, oriented. Skin: Skin is warm and dry. Left chest pacemaker site well healed. Psychiatric: Normal mood and affect. Behavior is normal. Judgment and thought content normal.        Assessment/Plan:     Imaging/Studies:  Stress echo (11/23/2021): LVEF 55%. No ischemia. 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 Echo (02/07/2018): LVEF 50-55%. Mild to mod TR. ICD-10-CM ICD-9-CM    1. Cardiac pacemaker in situ  Z95.0 V45.01 CBC WITH AUTOMATED DIFF      METABOLIC PANEL, BASIC      2. Sinus kathi-tachy syndrome (HCC)  I49.5 427.81 CBC WITH AUTOMATED DIFF      METABOLIC PANEL, BASIC      3. Primary hypertension  I10 401.9 CBC WITH AUTOMATED DIFF      METABOLIC PANEL, BASIC          Medtronic dual chamber pacemaker (DOI 08/08/2022): RA lead threshold increased, has confirmed change in orientation of RA lead on CXR. Current settings will drain generator in 3.5 years. RA & RV pacer dependent. Reviewed risks/benefits of RA lead repositioning. He agrees to proceed as scheduled. Labs ordered. Hibiclens reviewed. States he had significant after effects post implant due to sedation, required ER visit. Will order MAC & request that Zofran is given prior to sedation. HTN: BP controlled. Continue current medication regimen.       Future Appointments   Date Time Provider Christiano Garcia 10/28/2022  9:00 AM PACEMAKER3, [place] [facility] AM[facility]   10/28/2022  9:30 AM WOUND CHECKS, [place] [facility] AMB   2/24/2023  8:40 AM REMOTE1, [place] [facility] AMB     Thank you for involving me in this patient's care and please call with further concerns or questions.     JEFFY RamirezJordon  [facility] [place]  10/10/22

## 2022-10-10 ENCOUNTER — OFFICE VISIT (OUTPATIENT)
Dept: CARDIOLOGY CLINIC | Age: 82
End: 2022-10-10
Payer: MEDICARE

## 2022-10-10 VITALS
RESPIRATION RATE: 18 BRPM | OXYGEN SATURATION: 99 % | HEART RATE: 83 BPM | DIASTOLIC BLOOD PRESSURE: 60 MMHG | WEIGHT: 174.6 LBS | HEIGHT: 70 IN | SYSTOLIC BLOOD PRESSURE: 128 MMHG | BODY MASS INDEX: 25 KG/M2

## 2022-10-10 DIAGNOSIS — I10 PRIMARY HYPERTENSION: ICD-10-CM

## 2022-10-10 DIAGNOSIS — Z95.0 CARDIAC PACEMAKER IN SITU: Primary | ICD-10-CM

## 2022-10-10 DIAGNOSIS — I49.5 SINUS BRADY-TACHY SYNDROME (HCC): ICD-10-CM

## 2022-10-10 DIAGNOSIS — Z95.0 CARDIAC PACEMAKER IN SITU: ICD-10-CM

## 2022-10-10 PROCEDURE — 99024 POSTOP FOLLOW-UP VISIT: CPT | Performed by: NURSE PRACTITIONER

## 2022-10-10 RX ORDER — FUROSEMIDE 20 MG/1
TABLET ORAL
COMMUNITY
Start: 2022-09-07

## 2022-10-10 RX ORDER — OMEPRAZOLE 20 MG/1
CAPSULE, DELAYED RELEASE ORAL
COMMUNITY
Start: 2022-09-27

## 2022-10-10 NOTE — PATIENT INSTRUCTIONS
Your Right Atrial Lead Reposition procedure has been scheduled for 10/19/22 at 11:00 am, at [facility].    Please report to Admitting Department by 9:00 am, or 2 hours prior to your scheduled procedure. Please bring a list of your current medications and medication bottles, if able, to the hospital on this day. You will be unable to drive after your procedure so please make sure to bring someone with you to your procedure. You will need to have nothing to eat or drink after midnight, the night prior to your procedure. You may have small sips of water, if needed, to take with your medication. You will need labs drawn prior to your procedure. Please go to have this done today. You should not stop your medication prior to your scheduled procedure. After your procedure, you will need to follow up with Dr. Marroquin Ocosta nurse for a wound and device check. Your follow-up appointment has been scheduled for 10/28/22 at 9:00 am.     Hibiclens 4% topical solution has been ordered and sent into your pharmacy  Patient it start Hibiclens application 5 days prior to procedure date    Directions Hibiclens 4%: Start cleanse 5 days prior to procedure   1. Rinse area (upper chest and upper arms) with water. 2. Apply minimum amount necessary to scrub the upper chest area from shoulder/neck to mid line of chest and to below the nipple each of  5 nights before the day of the procedure  3. Let solution dry.

## 2022-10-11 ENCOUNTER — DOCUMENTATION ONLY (OUTPATIENT)
Dept: CARDIOLOGY CLINIC | Age: 82
End: 2022-10-11

## 2022-10-11 LAB
ANION GAP SERPL CALC-SCNC: 4 MMOL/L (ref 5–15)
BASOPHILS # BLD: 0.1 K/UL (ref 0–0.1)
BASOPHILS NFR BLD: 1 % (ref 0–1)
BUN SERPL-MCNC: 23 MG/DL (ref 6–20)
BUN/CREAT SERPL: 25 (ref 12–20)
CALCIUM SERPL-MCNC: 8.8 MG/DL (ref 8.5–10.1)
CHLORIDE SERPL-SCNC: 105 MMOL/L (ref 97–108)
CO2 SERPL-SCNC: 29 MMOL/L (ref 21–32)
CREAT SERPL-MCNC: 0.93 MG/DL (ref 0.7–1.3)
DIFFERENTIAL METHOD BLD: ABNORMAL
EOSINOPHIL # BLD: 0.1 K/UL (ref 0–0.4)
EOSINOPHIL NFR BLD: 2 % (ref 0–7)
ERYTHROCYTE [DISTWIDTH] IN BLOOD BY AUTOMATED COUNT: 12.7 % (ref 11.5–14.5)
GLUCOSE SERPL-MCNC: 87 MG/DL (ref 65–100)
HCT VFR BLD AUTO: 41.8 % (ref 36.6–50.3)
HGB BLD-MCNC: 13.6 G/DL (ref 12.1–17)
IMM GRANULOCYTES # BLD AUTO: 0 K/UL (ref 0–0.04)
IMM GRANULOCYTES NFR BLD AUTO: 0 % (ref 0–0.5)
LYMPHOCYTES # BLD: 0.5 K/UL (ref 0.8–3.5)
LYMPHOCYTES NFR BLD: 8 % (ref 12–49)
MCH RBC QN AUTO: 30 PG (ref 26–34)
MCHC RBC AUTO-ENTMCNC: 32.5 G/DL (ref 30–36.5)
MCV RBC AUTO: 92.1 FL (ref 80–99)
MONOCYTES # BLD: 0.7 K/UL (ref 0–1)
MONOCYTES NFR BLD: 11 % (ref 5–13)
NEUTS SEG # BLD: 4.9 K/UL (ref 1.8–8)
NEUTS SEG NFR BLD: 78 % (ref 32–75)
NRBC # BLD: 0 K/UL (ref 0–0.01)
NRBC BLD-RTO: 0 PER 100 WBC
PLATELET # BLD AUTO: 229 K/UL (ref 150–400)
PLATELET COMMENTS,PCOM: ABNORMAL
PMV BLD AUTO: 10 FL (ref 8.9–12.9)
POTASSIUM SERPL-SCNC: 4.3 MMOL/L (ref 3.5–5.1)
RBC # BLD AUTO: 4.54 M/UL (ref 4.1–5.7)
RBC MORPH BLD: ABNORMAL
SODIUM SERPL-SCNC: 138 MMOL/L (ref 136–145)
WBC # BLD AUTO: 6.3 K/UL (ref 4.1–11.1)

## 2022-10-11 NOTE — PROGRESS NOTES
Received notes from Dr. Clifford Duron. Echo (10/07/2022): LVEF 55%, mild LVH. Dilated RV with reduced contractility. Trace TR with dilated IVC. Small concentric pericardial effusion without tamponade physiology. Nuclear stress (10/07/2022): LVEF 54%. Normal myocardial perfusion.

## 2022-10-12 RX ORDER — SODIUM CHLORIDE 0.9 % (FLUSH) 0.9 %
5-40 SYRINGE (ML) INJECTION AS NEEDED
Status: CANCELLED | OUTPATIENT
Start: 2022-10-12

## 2022-10-12 RX ORDER — SODIUM CHLORIDE 0.9 % (FLUSH) 0.9 %
5-40 SYRINGE (ML) INJECTION EVERY 8 HOURS
Status: CANCELLED | OUTPATIENT
Start: 2022-10-12

## 2022-10-17 ENCOUNTER — DOCUMENTATION ONLY (OUTPATIENT)
Dept: CARDIOLOGY CLINIC | Age: 82
End: 2022-10-17

## 2022-10-17 NOTE — PROGRESS NOTES
Received notes from Dr. Elie Handley dated 10/12/2022.     Labs (09/02/2022)  Na 143  K 4.1  Glu 122  BUN 19  Cr 0.88  AST 15  ALT 26  WBC 6.6  Hgb 11.6  Hct 34.9  Plt 186

## 2022-10-19 ENCOUNTER — ANESTHESIA EVENT (OUTPATIENT)
Dept: CARDIAC CATH/INVASIVE PROCEDURES | Age: 82
End: 2022-10-19

## 2022-10-19 ENCOUNTER — HOSPITAL ENCOUNTER (OUTPATIENT)
Age: 82
Setting detail: OUTPATIENT SURGERY
Discharge: HOME | End: 2022-10-19
Attending: INTERNAL MEDICINE | Admitting: INTERNAL MEDICINE
Payer: MEDICARE

## 2022-10-19 ENCOUNTER — ANESTHESIA (OUTPATIENT)
Dept: CARDIAC CATH/INVASIVE PROCEDURES | Age: 82
End: 2022-10-19

## 2022-10-19 DIAGNOSIS — I44.1 2ND DEGREE AV BLOCK: ICD-10-CM

## 2022-10-19 DIAGNOSIS — Z95.0 PACEMAKER: Primary | ICD-10-CM

## 2022-10-19 DIAGNOSIS — I49.5 SINUS BRADY-TACHY SYNDROME (HCC): ICD-10-CM

## 2022-10-19 DIAGNOSIS — I45.10 RBBB: ICD-10-CM

## 2022-10-19 PROCEDURE — 99024 POSTOP FOLLOW-UP VISIT: CPT | Performed by: INTERNAL MEDICINE

## 2022-10-19 PROCEDURE — 93280 PM DEVICE PROGR EVAL DUAL: CPT | Performed by: INTERNAL MEDICINE

## 2022-10-19 RX ORDER — ACETAMINOPHEN 325 MG/1
650 TABLET ORAL
Status: CANCELLED | OUTPATIENT
Start: 2022-10-19

## 2022-10-19 RX ORDER — SODIUM CHLORIDE 0.9 % (FLUSH) 0.9 %
5-40 SYRINGE (ML) INJECTION AS NEEDED
Status: DISCONTINUED | OUTPATIENT
Start: 2022-10-19 | End: 2022-10-20 | Stop reason: HOSPADM

## 2022-10-19 RX ORDER — HYDROCODONE BITARTRATE AND ACETAMINOPHEN 5; 325 MG/1; MG/1
1 TABLET ORAL
Status: CANCELLED | OUTPATIENT
Start: 2022-10-19

## 2022-10-19 RX ORDER — SODIUM CHLORIDE 0.9 % (FLUSH) 0.9 %
5-40 SYRINGE (ML) INJECTION EVERY 8 HOURS
Status: CANCELLED | OUTPATIENT
Start: 2022-10-19

## 2022-10-19 RX ORDER — ONDANSETRON 2 MG/ML
4 INJECTION INTRAMUSCULAR; INTRAVENOUS
Status: CANCELLED | OUTPATIENT
Start: 2022-10-19

## 2022-10-19 RX ORDER — SODIUM CHLORIDE 0.9 % (FLUSH) 0.9 %
5-40 SYRINGE (ML) INJECTION EVERY 8 HOURS
Status: DISCONTINUED | OUTPATIENT
Start: 2022-10-19 | End: 2022-10-20 | Stop reason: HOSPADM

## 2022-10-19 RX ORDER — SODIUM CHLORIDE 0.9 % (FLUSH) 0.9 %
5-40 SYRINGE (ML) INJECTION AS NEEDED
Status: CANCELLED | OUTPATIENT
Start: 2022-10-19

## 2022-10-19 NOTE — DISCHARGE INSTRUCTIONS
Resume normal activities    Future Appointments   Date Time Provider Christiano Garcia   2/24/2023  8:40 Danuta Otoole M.D.  [facility]  Electrophysiology/Cardiology  [facility] and [facility] [place]  [address] [place] [address] [facility] Tim Nir [address] [place] [address]  [phone]

## 2022-10-19 NOTE — PROCEDURES
There was high RA pacing threshold 3.25 V @ 1 ms when checking with Medtronic rep Karina Kyle but he has sinus rate 62 bpm so I programmed pacer to DDD  bpm and he only RV pacing so he did not need RA lead repositioned. He told me he had significant problem with sedatives and had to come to ER last time after pacer implant. He agreed with continuing to check pacer in office and remotely as usual and not repositioning the RA lead that is mainly used for sensing. His son discussed with me  Plan:  The risk, benefits, expected outcome, and alternative to the recommended procedure have been discussed with the patient.   Patient understands and wants to cancel the procedure

## 2022-10-19 NOTE — ANESTHESIA PREPROCEDURE EVALUATION
Relevant Problems   No relevant active problems       Anesthetic History   No history of anesthetic complications            Review of Systems / Medical History  Patient summary reviewed, nursing notes reviewed and pertinent labs reviewed    Pulmonary  Within defined limits                 Neuro/Psych   Within defined limits           Cardiovascular    Hypertension        Dysrhythmias   Pacemaker, CAD and hyperlipidemia    Exercise tolerance: >4 METS  Comments: Sinus bradycardia  1st degree AV block  2nd degree AV block  RBBB  Pacemaker     GI/Hepatic/Renal  Within defined limits              Endo/Other  Within defined limits           Other Findings              Physical Exam    Airway  Mallampati: II  TM Distance: 4 - 6 cm  Neck ROM: normal range of motion   Mouth opening: Normal     Cardiovascular    Rhythm: regular  Rate: normal         Dental  No notable dental hx       Pulmonary  Breath sounds clear to auscultation               Abdominal  GI exam deferred       Other Findings            Anesthetic Plan    ASA: 3  Anesthesia type: MAC          Induction: Intravenous  Anesthetic plan and risks discussed with: Patient

## 2022-10-19 NOTE — INTERVAL H&P NOTE
Update History & Physical    The Patient's History and Physical of October 10,   2022 was reviewed with the patient and I examined the patient. There was high RA pacing threshold 3.25 V @ 1 ms when checking with Medtronic rep Azeb Hilton but he has sinus rate 62 bpm so I programmed pacer to DDD  bpm and he only RV pacing so he did not need RA lead repositioned. He told me he had significant problem with sedatives and had to come to ER last time after pacer implant. He agreed with continuing to check pacer in office and remotely as usual and not repositioning the RA lead that is mainly used for sensing. His son discussed with me  Plan:  The risk, benefits, expected outcome, and alternative to the recommended procedure have been discussed with the patient.   Patient understands and wants to cancel the procedure    Electronically signed by Chavo Mcneill MD on 10/19/2022 at 10:46 AM

## 2022-10-19 NOTE — DISCHARGE SUMMARY
Cardiology Discharge Summary               Patient ID:  Jayshree Castanon  [id number]  32 y.o.  1940    Admit Date: 10/19/2022    Discharge Date: 10/19/2022     Admitting Physician: Joelle Angulo MD     Discharge Physician: Joelle Angulo MD    Admission Diagnoses: Sinus kathi-tachy syndrome [facility]) [I49.5]    Discharge Diagnoses: Principal Problem:    Pacemaker (8/8/2022)      Overview: 8/8/22 dual chamber Medtronic    Active Problems:    2nd degree AV block (5/19/2022)        Discharge Condition: Stable    Cardiology Procedures this Admission:   pacemaker interrogation and reprogramming    Hospital Course: 79 y. o.man who presented to EP lab for pacemaker right atrial lead reposition or reimplant because of high pacing threshold  He was anxious  He had pacemaker procedure this year and then had reaction to anesthesia versed and fentanyl so this is now arranged with Dr Neymar Mariscal anesthesiologist's assistance  He is concerned that he may have the same problem again afterwards  I rechecked his pacemaker and it showed RA pacing threshold 3.25 V @ 1 ms and pacing impedance 342 ohms  RV pacing threshold 0.5 V @ 0.4 ms    He has sinus rate 62 bpm   He needs RV pacing    Therefore I programmed pacer to DDD  bpm   He was atrial sensing and ventricular pacing  Therefore I recommended him not to redo RA lead today  He and his son agree with my recommendation   Will follow up further in office    Consults: None    Discharge Exam:   There were no vitals taken for this visit. General Appearance:  Well developed, well nourished,alert and oriented x 3, and individual in no acute distress. Ears/Nose/Mouth/Throat:   Hearing grossly normal.         Neck: Supple. Chest:   Lungs clear to auscultation bilaterally. Cardiovascular:  Regular rate and rhythm, no murmur. Abdomen:   Soft, non-tender    Extremities: No edema bilaterally. Skin: Warm and dry.                Disposition: home    Patient Instructions:   Current Discharge Medication List        CONTINUE these medications which have NOT CHANGED    Details   furosemide (LASIX) 20 mg tablet TAKE 1 TABLET BY MOUTH EVERY DAY FOR 30 DAYS      omeprazole (PRILOSEC) 20 mg capsule       ondansetron (ZOFRAN ODT) 4 mg disintegrating tablet Take 1 Tablet by mouth every eight (8) hours as needed for Nausea or Vomiting. Qty: 30 Tablet, Refills: 0      hydroCHLOROthiazide (MICROZIDE) 12.5 mg capsule Take 12.5 mg by mouth daily. losartan (COZAAR) 100 mg tablet Take 100 mg by mouth daily. Takes 1/2 tablet daily      meloxicam (MOBIC) 15 mg tablet Take 1 Tablet by mouth daily. melatonin 5 mg cap capsule Take 5 mg by mouth nightly. aspirin delayed-release 81 mg tablet Take 81 mg by mouth daily. rosuvastatin (CRESTOR) 20 mg tablet Take 20 mg by mouth nightly. tamsulosin (FLOMAX) 0.4 mg capsule Take 0.4 mg by mouth daily. cholecalciferol (VITAMIN D3) 25 mcg (1,000 unit) cap Take 1,000 Units by mouth daily. doxycycline (VIBRA-TABS) 100 mg tablet Take 1 Tab by mouth two (2) times a day.   Qty: 13 Tab, Refills: 0              Future Appointments   Date Time Provider [facility]   2/24/2023  8:40 AM [facility] AMB       Signed:  Kris Oneal MD  10/19/2022  5:39 PM

## 2022-11-22 ENCOUNTER — OFFICE VISIT (OUTPATIENT)
Dept: CARDIOLOGY CLINIC | Age: 82
End: 2022-11-22
Payer: MEDICARE

## 2022-11-22 ENCOUNTER — OFFICE VISIT (OUTPATIENT)
Dept: CARDIOLOGY CLINIC | Age: 82
End: 2022-11-22

## 2022-11-22 VITALS
WEIGHT: 178 LBS | BODY MASS INDEX: 25.54 KG/M2 | RESPIRATION RATE: 16 BRPM | SYSTOLIC BLOOD PRESSURE: 110 MMHG | OXYGEN SATURATION: 97 % | DIASTOLIC BLOOD PRESSURE: 60 MMHG | HEART RATE: 65 BPM

## 2022-11-22 DIAGNOSIS — Z95.0 CARDIAC PACEMAKER IN SITU: Primary | ICD-10-CM

## 2022-11-22 DIAGNOSIS — I45.10 RBBB: ICD-10-CM

## 2022-11-22 DIAGNOSIS — I44.1 2ND DEGREE AV BLOCK: ICD-10-CM

## 2022-11-22 DIAGNOSIS — I49.5 SICK SINUS SYNDROME (HCC): ICD-10-CM

## 2022-11-22 PROCEDURE — G8427 DOCREV CUR MEDS BY ELIG CLIN: HCPCS | Performed by: INTERNAL MEDICINE

## 2022-11-22 PROCEDURE — G8510 SCR DEP NEG, NO PLAN REQD: HCPCS | Performed by: INTERNAL MEDICINE

## 2022-11-22 PROCEDURE — 1101F PT FALLS ASSESS-DOCD LE1/YR: CPT | Performed by: INTERNAL MEDICINE

## 2022-11-22 PROCEDURE — G8754 DIAS BP LESS 90: HCPCS | Performed by: INTERNAL MEDICINE

## 2022-11-22 PROCEDURE — G8417 CALC BMI ABV UP PARAM F/U: HCPCS | Performed by: INTERNAL MEDICINE

## 2022-11-22 PROCEDURE — 93288 INTERROG EVL PM/LDLS PM IP: CPT | Performed by: INTERNAL MEDICINE

## 2022-11-22 PROCEDURE — 99214 OFFICE O/P EST MOD 30 MIN: CPT | Performed by: INTERNAL MEDICINE

## 2022-11-22 PROCEDURE — 1123F ACP DISCUSS/DSCN MKR DOCD: CPT | Performed by: INTERNAL MEDICINE

## 2022-11-22 PROCEDURE — G8536 NO DOC ELDER MAL SCRN: HCPCS | Performed by: INTERNAL MEDICINE

## 2022-11-22 PROCEDURE — G8752 SYS BP LESS 140: HCPCS | Performed by: INTERNAL MEDICINE

## 2022-11-22 NOTE — PROGRESS NOTES
Cardiac Electrophysiology OFFICE Note     Subjective:      Jhonny Perrin is an 79 y.o. patient who presents for Medtronic dual chamber pacemaker implant (DOI 2022) follow up  [facility] [place] [place]  He feels better now with walking and exercise     LVEF normal on stress echo in 2021. BP controlled. Previous:  Medtronic dual chamber pacemaker (DOI 2022). TSH 1.84 in 2021. Dr. Nils Curtis is primary cardiologist.    Father  age 66, had hemachromatosis. Mother  age 80. Brother with pacemaker. Problem List  Date Reviewed: 2022            Codes Class Noted    Pacemaker ICD-10-CM: Z95.0  ICD-9-CM: V45.01  2022    Overview Signed 2022 11:52 AM by Juju Harrison MD     22 dual chamber Medtronic             Sinus bradycardia ICD-10-CM: R00.1  ICD-9-CM: 427.89  2022        1st degree AV block ICD-10-CM: I44.0  ICD-9-CM: 426.11  2022        2nd degree AV block ICD-10-CM: I44.1  ICD-9-CM: 426.13  2022        RBBB ICD-10-CM: I45.10  ICD-9-CM: 426.4  2022           Current Outpatient Medications   Medication Sig Dispense Refill    furosemide (LASIX) 20 mg tablet TAKE 1 TABLET BY MOUTH EVERY DAY FOR 30 DAYS      omeprazole (PRILOSEC) 20 mg capsule       losartan (COZAAR) 100 mg tablet Take 100 mg by mouth daily. Takes 1/2 tablet daily      meloxicam (MOBIC) 15 mg tablet Take 1 Tablet by mouth daily. melatonin 5 mg cap capsule Take 5 mg by mouth nightly. aspirin delayed-release 81 mg tablet Take 81 mg by mouth daily. rosuvastatin (CRESTOR) 20 mg tablet Take 20 mg by mouth nightly. tamsulosin (FLOMAX) 0.4 mg capsule Take 0.4 mg by mouth daily. cholecalciferol (VITAMIN D3) 25 mcg (1,000 unit) cap Take 1,000 Units by mouth daily. doxycycline (VIBRA-TABS) 100 mg tablet Take 1 Tab by mouth two (2) times a day.  13 Tab 0    ondansetron (ZOFRAN ODT) 4 mg disintegrating tablet Take 1 Tablet by mouth every eight (8) hours as needed for Nausea or Vomiting. (Patient not taking: Reported on 11/22/2022) 30 Tablet 0    hydroCHLOROthiazide (MICROZIDE) 12.5 mg capsule Take 12.5 mg by mouth daily. (Patient not taking: No sig reported)       Allergies   Allergen Reactions    Codeine Nausea and Vomiting     Past Medical History:   Diagnosis Date    CAD (coronary artery disease)     High cholesterol     Hypertension     Irregular heart beat     Pneumonia      Past Surgical History:   Procedure Laterality Date    HX HERNIA REPAIR      X2    HX KNEE REPLACEMENT      bilateral     No family hx of pacemaker  Social History     Tobacco Use    Smoking status: Never    Smokeless tobacco: Never   Substance Use Topics    Alcohol use: Yes     Comment: one a week        Review of Systems: Review of all other systems otherwise negative. Constitutional: Negative for fever, chills, weight loss, malaise/fatigue. HEENT: Negative for nosebleeds, vision changes. Respiratory: Negative for cough, hemoptysis. Cardiovascular: Negative for chest pain, palpitations, orthopnea, claudication, + occasional leg swelling, no syncope, and PND. Gastrointestinal: Negative for nausea, vomiting, diarrhea, blood in stool and melena. Genitourinary: Negative for dysuria, and hematuria. Musculoskeletal: Negative for myalgias, arthralgia. Skin: Negative for rash. Heme: Does not bleed or bruise easily. Neurological: Negative for speech change and focal weakness. Objective:     Visit Vitals  /60   Pulse 65   Resp 16   Wt 178 lb (80.7 kg)   SpO2 97%   BMI 25.54 kg/m²        Physical Exam:   Constitutional: Well-developed and well-nourished. No respiratory distress. Head: Normocephalic and atraumatic. Eyes: Pupils are equal, round. ENT: Hearing grossly normal.  Neck: Supple. No JVD present. Cardiovascular: Regular rate, regular rhythm. Exam reveals no gallop and no friction rub. No murmur heard.   Pulmonary/Chest: Effort normal and breath sounds normal. No wheezes. Abdominal: Soft, no tenderness. Musculoskeletal: Moves extremities independently. Normal gait. Vasc/lymphatic: No edema. Neurological: Alert, oriented. Skin: Skin is warm and dry. Left chest pacemaker site well healed. Psychiatric: Normal mood and affect. Behavior is normal. Judgment and thought content normal.        Assessment/Plan:     Imaging/Studies:  Stress echo (11/23/2021): LVEF 55%. No ischemia. 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 Echo (02/07/2018): LVEF 50-55%. Mild to mod TR. ICD-10-CM ICD-9-CM    1. Cardiac pacemaker in situ  Z95.0 V45.01       2. Sick sinus syndrome (HCC)  I49.5 427.81       3. RBBB  I45.10 426.4       4. 2nd degree AV block  I44.1 426.13         10% RA pacing and 100% RV pacing    Medtronic dual chamber pacemaker (DOI 08/08/2022): both leads have good function   I changed setting to DDDR  bpm since he has second degree av block and needs RV pacing   I changed output chronic setting with good safety margin   He is not dependent   Battery now increased to 11 years from 6 years when output of leads are 4 V @ 1 ms    HTN: BP controlled. Future Appointments   Date Time Provider Christiano Garcia   2/24/2023  8:40 AM REMOTE1, [place] [facility] AM[facility]   5/24/2023 12:45 PM REMOTE1, [place] [facility] AMB   11/8/2023 10:40 AM PACEMAKER, [place] P[facility] AMB   11/8/2023 11:00 AM Soto Damian MD [facility] BS AMB         Thank you for involving me in this patient's care and please call with further concerns or questions. Burt Rubin M.D.  [facility]  Electrophysiology/Cardiology  [facility] and [facility] [place]  [address] [place] [address]                              [phone]

## 2023-02-13 ENCOUNTER — DOCUMENTATION ONLY (OUTPATIENT)
Dept: CARDIOLOGY CLINIC | Age: 83
End: 2023-02-13

## 2023-02-13 ENCOUNTER — TELEPHONE (OUTPATIENT)
Dept: CARDIOLOGY CLINIC | Age: 83
End: 2023-02-13

## 2023-02-13 DIAGNOSIS — I48.0 PAROXYSMAL ATRIAL FIBRILLATION (HCC): Primary | ICD-10-CM

## 2023-02-13 NOTE — TELEPHONE ENCOUNTER
Shay Webb MD at 02/13/23 0940    Status: Signed   Ongoing AF on pacer  Recommend eliquis 5 mg bid instead of aspirin             Phoned patient, ID verified using two patient identifiers   Advised of above recommendation. Confirmed preferred pharmacy and advised patient to call back with any prescription constraints. (30d free card available or can try Xarelto.)  Also advised to monitor symptoms and BP at home. Denies any palpitations/ SOB at this time.

## 2023-02-24 ENCOUNTER — OFFICE VISIT (OUTPATIENT)
Dept: CARDIOLOGY CLINIC | Age: 83
End: 2023-02-24
Payer: MEDICARE

## 2023-02-24 DIAGNOSIS — Z95.0 CARDIAC PACEMAKER IN SITU: Primary | ICD-10-CM

## 2023-07-27 ENCOUNTER — TELEPHONE (OUTPATIENT)
Age: 83
End: 2023-07-27

## 2023-07-31 NOTE — TELEPHONE ENCOUNTER
Verified patient with two types of identifiers. Patient states that he is having a hernia surgery on Wednesday and his surgeon told him that he needs stop his Eliquis 2 days prior to procedure. Informed patient that his surgeon will tell him when he needs to restart his Eliquis. Will make sure Dr. Deni De La Vega NP are aware. Patient verbalized understanding and will call with any other questions.

## 2023-07-31 NOTE — TELEPHONE ENCOUNTER
OK to hold Eliquis x 2 days prior to hernia surgery, should resume as soon as cleared to do so by surgeon.

## 2023-08-01 ENCOUNTER — TELEPHONE (OUTPATIENT)
Age: 83
End: 2023-08-01

## 2023-08-01 ENCOUNTER — TRANSCRIBE ORDERS (OUTPATIENT)
Facility: HOSPITAL | Age: 83
End: 2023-08-01

## 2023-08-01 DIAGNOSIS — M26.603 BILATERAL TEMPOROMANDIBULAR JOINT DISORDER: Primary | ICD-10-CM

## 2023-08-09 ENCOUNTER — HOSPITAL ENCOUNTER (OUTPATIENT)
Facility: HOSPITAL | Age: 83
Discharge: HOME | End: 2023-08-12
Attending: DENTIST
Payer: MEDICARE

## 2023-08-09 DIAGNOSIS — M26.603 BILATERAL TEMPOROMANDIBULAR JOINT DISORDER: ICD-10-CM

## 2023-08-09 PROCEDURE — 70487 CT MAXILLOFACIAL W/DYE: CPT

## 2023-08-09 PROCEDURE — 82565 ASSAY OF CREATININE: CPT

## 2023-08-09 PROCEDURE — 6360000004 HC RX CONTRAST MEDICATION: Performed by: DENTIST

## 2023-08-09 RX ADMIN — IOPAMIDOL 100 ML: 755 INJECTION, SOLUTION INTRAVENOUS at 12:46

## 2023-08-11 LAB — CREAT BLD-MCNC: 0.8 MG/DL (ref 0.6–1.3)

## 2023-11-08 ENCOUNTER — PROCEDURE VISIT (OUTPATIENT)
Age: 83
End: 2023-11-08

## 2023-11-08 ENCOUNTER — OFFICE VISIT (OUTPATIENT)
Age: 83
End: 2023-11-08
Payer: MEDICARE

## 2023-11-08 VITALS
OXYGEN SATURATION: 97 % | RESPIRATION RATE: 16 BRPM | SYSTOLIC BLOOD PRESSURE: 122 MMHG | BODY MASS INDEX: 24.57 KG/M2 | WEIGHT: 171.6 LBS | HEIGHT: 70 IN | HEART RATE: 88 BPM | DIASTOLIC BLOOD PRESSURE: 82 MMHG

## 2023-11-08 DIAGNOSIS — I49.5 SICK SINUS SYNDROME (HCC): ICD-10-CM

## 2023-11-08 DIAGNOSIS — Z79.01 ANTICOAGULATED: ICD-10-CM

## 2023-11-08 DIAGNOSIS — I48.0 PAROXYSMAL ATRIAL FIBRILLATION (HCC): ICD-10-CM

## 2023-11-08 DIAGNOSIS — I10 PRIMARY HYPERTENSION: ICD-10-CM

## 2023-11-08 DIAGNOSIS — I45.10 RBBB: ICD-10-CM

## 2023-11-08 DIAGNOSIS — I44.30 AV BLOCK: ICD-10-CM

## 2023-11-08 DIAGNOSIS — Z95.0 PRESENCE OF CARDIAC PACEMAKER: Primary | ICD-10-CM

## 2023-11-08 DIAGNOSIS — Z95.0 PACEMAKER: Primary | ICD-10-CM

## 2023-11-08 PROCEDURE — G8484 FLU IMMUNIZE NO ADMIN: HCPCS | Performed by: INTERNAL MEDICINE

## 2023-11-08 PROCEDURE — 3074F SYST BP LT 130 MM HG: CPT | Performed by: INTERNAL MEDICINE

## 2023-11-08 PROCEDURE — 1123F ACP DISCUSS/DSCN MKR DOCD: CPT | Performed by: INTERNAL MEDICINE

## 2023-11-08 PROCEDURE — G8420 CALC BMI NORM PARAMETERS: HCPCS | Performed by: INTERNAL MEDICINE

## 2023-11-08 PROCEDURE — 1036F TOBACCO NON-USER: CPT | Performed by: INTERNAL MEDICINE

## 2023-11-08 PROCEDURE — 99214 OFFICE O/P EST MOD 30 MIN: CPT | Performed by: INTERNAL MEDICINE

## 2023-11-08 PROCEDURE — 3078F DIAST BP <80 MM HG: CPT | Performed by: INTERNAL MEDICINE

## 2023-11-08 PROCEDURE — G8427 DOCREV CUR MEDS BY ELIG CLIN: HCPCS | Performed by: INTERNAL MEDICINE

## 2023-11-08 NOTE — H&P (VIEW-ONLY)
Cardiac Electrophysiology OFFICE Follow Up Note               Assessment/Plan:      Diagnosis Orders   1. Pacemaker  Basic Metabolic Panel    Magnesium      2. Sick sinus syndrome (HCC)  Basic Metabolic Panel    Magnesium      3. Paroxysmal atrial fibrillation (HCC)  Basic Metabolic Panel    Magnesium      4. Anticoagulated  Basic Metabolic Panel    Magnesium      5. Primary hypertension  Basic Metabolic Panel    Magnesium      6. RBBB  Basic Metabolic Panel    Magnesium      7. AV block                Medtronic dual chamber pacemaker (DOI 08/08/2022)   6.6 years   14% A-paced   60.9% V-paced   76.4% AF/Fl burden   - Continue Q3 month remote device transmissions.   - Follow up in the EP clinic in one year, or sooner for any concerns. Persistent atrial fib/flutter   - Nuclear stress test October 2022 showed no evidence of ischemia   - Echo 10/10/22: EF 55%, no significant valvular disease, small concentric pericardial effusion without tamponade.   - 76.4% burden via device but essentially persistent for the last few months   - Discussed cardioversion and medication vs ablation since rates uncontrolled to prevent CHF. - We will proceed with DCCV. The procedure and its risks were reviewed. Anticoagulation   - Continue Eliquis   - Denies of any bleeding issues. Know to contact clinic with any bleeding side effects (BRBPR, melena, hemotysis, hematuria). HTN   BP controlled. - Continue losartan       Future Appointments   Date Time Provider [address]   11/8/2024 10:40 AM PACEMAKERLILIBETH AMB   11/8/2024 11:00 AM WILY Coppola - TAMIR [facility]AV[facility] CHRISTOS Weber M.D.    Electrophysiology/Cardiology   [facility] and [facility] [place]   [address] [place] [address]   [facility], [facility] [address]                             [address]   [phone]

## 2023-11-08 NOTE — PATIENT INSTRUCTIONS
You are scheduled for the following procedure with Dr. Felicity Vail:  Cardioversion at [facility], [address]        PLEASE be aware that your procedure date/time is tentative and subject to change due to emergency cases. Procedure date/time:    November 20, 2023  Time: 12:00 pm - please arrive by  10:00 am      ARRIVAL time:  (You will need  to be discharged home with.)     [X]  [facility] procedures: please arrive to check in on 2nd floor two hours prior to your procedure the day of your procedure. Pre-procedure Labs/Imaging:    PRE-PROCEDURE LABS NEEDED: YES - before 11/17/23  Forms for labwork may be given at appointment. If not, they will be mailed to you. Labs should be completed within 5-7 days of procedure. These can be done at any [employer] or [employer].         [address] [facility]  [address] [facility], [address] [address] [address]   Monday-Friday 730A-430P (closed [id number])  [phone]    [address] [place] [address], [address] [place] [address]/[facility]  Monday-Friday 8:00AM - 5:00 PM   [phone]    [facility]  [address] [facility] [address] [facility]  Monday-Friday 7A-5P  [address] [facility], [address] [facility], [address] [facility], [facility] [address]   Monday-Friday 704C-345U  [phone] (closed 1-2P)    [facility]  [address] , [address] [facility] [address] [place] [address]  [facility], [address] [facility]  Monday-Friday 730A-430P ([id number])  [phone]    39[address] , [address] [facility]  Monday-Friday 7A-430P  [address] [facility], [address] [facility]  Monday-Friday 730A-430P (closed 1-2P)  [phone]        Medication Instructions:    Please do not stop or hold any medications prior to your

## 2023-11-13 DIAGNOSIS — Z95.0 PACEMAKER: ICD-10-CM

## 2023-11-13 DIAGNOSIS — I49.5 SICK SINUS SYNDROME (HCC): ICD-10-CM

## 2023-11-13 DIAGNOSIS — I10 PRIMARY HYPERTENSION: ICD-10-CM

## 2023-11-13 DIAGNOSIS — I45.10 RBBB: ICD-10-CM

## 2023-11-13 DIAGNOSIS — Z79.01 ANTICOAGULATED: ICD-10-CM

## 2023-11-13 DIAGNOSIS — I48.0 PAROXYSMAL ATRIAL FIBRILLATION (HCC): ICD-10-CM

## 2023-11-13 LAB
ANION GAP SERPL CALC-SCNC: 4 MMOL/L (ref 5–15)
BUN SERPL-MCNC: 18 MG/DL (ref 6–20)
BUN/CREAT SERPL: 19 (ref 12–20)
CALCIUM SERPL-MCNC: 8.9 MG/DL (ref 8.5–10.1)
CHLORIDE SERPL-SCNC: 108 MMOL/L (ref 97–108)
CO2 SERPL-SCNC: 29 MMOL/L (ref 21–32)
CREAT SERPL-MCNC: 0.94 MG/DL (ref 0.7–1.3)
GLUCOSE SERPL-MCNC: 89 MG/DL (ref 65–100)
MAGNESIUM SERPL-MCNC: 2 MG/DL (ref 1.6–2.4)
POTASSIUM SERPL-SCNC: 4 MMOL/L (ref 3.5–5.1)
SODIUM SERPL-SCNC: 141 MMOL/L (ref 136–145)

## 2023-11-14 ENCOUNTER — PREP FOR PROCEDURE (OUTPATIENT)
Age: 83
End: 2023-11-14

## 2023-11-14 RX ORDER — SODIUM CHLORIDE 0.9 % (FLUSH) 0.9 %
5-40 SYRINGE (ML) INJECTION EVERY 12 HOURS SCHEDULED
Status: CANCELLED | OUTPATIENT
Start: 2023-11-14

## 2023-11-14 RX ORDER — SODIUM CHLORIDE 9 MG/ML
INJECTION, SOLUTION INTRAVENOUS PRN
Status: CANCELLED | OUTPATIENT
Start: 2023-11-14

## 2023-11-14 RX ORDER — SODIUM CHLORIDE 0.9 % (FLUSH) 0.9 %
5-40 SYRINGE (ML) INJECTION PRN
Status: CANCELLED | OUTPATIENT
Start: 2023-11-14

## 2023-11-19 NOTE — DISCHARGE INSTRUCTIONS
Discharge Instructions Post Cardioversion    Start flecainide 50 mg twice a day and toprol xl 25 mg once a day to prevent atrial fibrillation recurrence  Resume all other medications as usual    No driving x 24 hours due to sedation medication that you received during your procedure. Resume medications as previously prescribed. Follow up as noted below. Future Appointments   Date Time Provider [address]   12/11/2023  2:20 PM WILY Galeano NP [facility]   11/8/2024 10:40 AM PACEMAKER, STFRLENNIE [facility] BS AMB   11/8/2024 11:00 AM WILY Galeano NP [facility] Moralez M.D.   Electrophysiology/Cardiology  [facility]  [address] [place], [place] [address]juan Obrienco Molly, [address] [place] [address]              [id number]

## 2023-11-20 ENCOUNTER — HOSPITAL ENCOUNTER (OUTPATIENT)
Facility: HOSPITAL | Age: 83
Setting detail: OUTPATIENT SURGERY
Discharge: HOME | End: 2023-11-20
Attending: INTERNAL MEDICINE | Admitting: INTERNAL MEDICINE
Payer: MEDICARE

## 2023-11-20 VITALS
HEIGHT: 70 IN | WEIGHT: 168 LBS | HEART RATE: 82 BPM | RESPIRATION RATE: 23 BRPM | SYSTOLIC BLOOD PRESSURE: 133 MMHG | DIASTOLIC BLOOD PRESSURE: 97 MMHG | BODY MASS INDEX: 24.05 KG/M2 | OXYGEN SATURATION: 95 %

## 2023-11-20 DIAGNOSIS — I48.92 ATRIAL FLUTTER WITH RAPID VENTRICULAR RESPONSE (HCC): ICD-10-CM

## 2023-11-20 LAB
ANION GAP SERPL CALC-SCNC: 5 MMOL/L (ref 5–15)
BUN SERPL-MCNC: 17 MG/DL (ref 6–20)
BUN/CREAT SERPL: 17 (ref 12–20)
CALCIUM SERPL-MCNC: 8.9 MG/DL (ref 8.5–10.1)
CHLORIDE SERPL-SCNC: 109 MMOL/L (ref 97–108)
CO2 SERPL-SCNC: 27 MMOL/L (ref 21–32)
CREAT SERPL-MCNC: 0.98 MG/DL (ref 0.7–1.3)
ECHO BSA: 1.94 M2
GLUCOSE SERPL-MCNC: 111 MG/DL (ref 65–100)
MAGNESIUM SERPL-MCNC: 2 MG/DL (ref 1.6–2.4)
POTASSIUM SERPL-SCNC: 3.7 MMOL/L (ref 3.5–5.1)
SODIUM SERPL-SCNC: 141 MMOL/L (ref 136–145)

## 2023-11-20 PROCEDURE — 92960 CARDIOVERSION ELECTRIC EXT: CPT | Performed by: INTERNAL MEDICINE

## 2023-11-20 PROCEDURE — 2500000003 HC RX 250 WO HCPCS: Performed by: INTERNAL MEDICINE

## 2023-11-20 PROCEDURE — 2709999900 HC NON-CHARGEABLE SUPPLY: Performed by: INTERNAL MEDICINE

## 2023-11-20 PROCEDURE — 36415 COLL VENOUS BLD VENIPUNCTURE: CPT

## 2023-11-20 PROCEDURE — 80048 BASIC METABOLIC PNL TOTAL CA: CPT

## 2023-11-20 PROCEDURE — 83735 ASSAY OF MAGNESIUM: CPT

## 2023-11-20 PROCEDURE — 99152 MOD SED SAME PHYS/QHP 5/>YRS: CPT | Performed by: INTERNAL MEDICINE

## 2023-11-20 RX ORDER — ONDANSETRON 2 MG/ML
4 INJECTION INTRAMUSCULAR; INTRAVENOUS EVERY 6 HOURS PRN
Status: DISCONTINUED | OUTPATIENT
Start: 2023-11-20 | End: 2023-11-20 | Stop reason: HOSPADM

## 2023-11-20 RX ORDER — ACETAMINOPHEN 325 MG/1
650 TABLET ORAL EVERY 4 HOURS PRN
Status: DISCONTINUED | OUTPATIENT
Start: 2023-11-20 | End: 2023-11-20 | Stop reason: HOSPADM

## 2023-11-20 RX ORDER — SODIUM CHLORIDE 9 MG/ML
INJECTION, SOLUTION INTRAVENOUS PRN
Status: DISCONTINUED | OUTPATIENT
Start: 2023-11-20 | End: 2023-11-20 | Stop reason: HOSPADM

## 2023-11-20 RX ORDER — SODIUM CHLORIDE 0.9 % (FLUSH) 0.9 %
5-40 SYRINGE (ML) INJECTION PRN
Status: DISCONTINUED | OUTPATIENT
Start: 2023-11-20 | End: 2023-11-20 | Stop reason: HOSPADM

## 2023-11-20 RX ORDER — METOPROLOL SUCCINATE 25 MG/1
25 TABLET, EXTENDED RELEASE ORAL DAILY
Qty: 30 TABLET | Refills: 3 | Status: SHIPPED | OUTPATIENT
Start: 2023-11-20

## 2023-11-20 RX ORDER — SODIUM CHLORIDE 0.9 % (FLUSH) 0.9 %
5-40 SYRINGE (ML) INJECTION EVERY 12 HOURS SCHEDULED
Status: DISCONTINUED | OUTPATIENT
Start: 2023-11-20 | End: 2023-11-20 | Stop reason: HOSPADM

## 2023-11-20 RX ORDER — FLECAINIDE ACETATE 50 MG/1
50 TABLET ORAL 2 TIMES DAILY
Qty: 60 TABLET | Refills: 3 | Status: SHIPPED | OUTPATIENT
Start: 2023-11-20

## 2023-11-20 NOTE — PROCEDURES
Cardiac Procedure Note   Patient: Mitchell Blake  MRN: [id number]  SSN: [id number]   YOB: 1940 Age: 79 y.o.   Sex: male    Date of Procedure: 11/20/2023   Pre-procedure Diagnosis: Atrial Fibrillation/Atrial Flutter/pacemaker  Post-procedure Diagnosis: same  Procedure: Cardioversion  :  Dr. Annie Price MD    Assistant(s):  None  Anesthesia: Moderate Sedation with brevital  Estimated Blood Loss: none  Specimens Removed: None  Findings: 200 J CV to NSR  Pacemaker check showed chronic RA pacing threshold elevation to 4.25 V @ 1 ms but RA pacing < 0.1 %  Complications: None   Implants:  None  Signed by:  Annie Price MD  11/20/2023  11:52 AM

## 2023-11-20 NOTE — PROGRESS NOTES
Stress test normal 10/2022 with Dr Roberta Duran  I started flecainide 50 mg bid and toprol 25 mg qd   If he fails cardioversion and med then ablation is recommended  I have explained to him

## 2023-11-20 NOTE — INTERVAL H&P NOTE
Update History & Physical    The patient's History and Physical of November 8, 2023 was reviewed with the patient and I examined the patient. There was no change. The surgical site was confirmed by the patient and me. Plan: The risks, benefits, expected outcome, and alternative to the recommended procedure have been discussed with the patient. Patient understands and wants to proceed with the procedure.      Electronically signed by Kady Gordon MD on 11/20/2023 at 10:34 AM

## 2023-11-20 NOTE — PROGRESS NOTES
10:07 AM  Patient arrived. ID and allergies verified verbally with patient. Pt voices understanding of procedure to be performed. Consent obtained. Pt prepped for procedure. Pt denies contrast allergy. Patient denies taking any blood thinners. 10:40 AM  TRANSFER - OUT REPORT:    Verbal report given to DEMETRIO Viveros on Crow Bolanos  being transferred to EP lab for ordered procedure       Report consisted of patient's Situation, Background, Assessment and   Recommendations(SBAR). Information from the following report(s) Nurse Handoff Report was reviewed with the receiving nurse. Lines:   Peripheral IV 11/20/23 Left Forearm (Active)        Opportunity for questions and clarification was provided. Patient transported with:  Registered Nurse    10:55 AM  TRANSFER - IN REPORT:    Verbal report received from DEMETRIO Dinh on Crow Bolanos  being received from EP lab for routine post-op      Report consisted of patient's Situation, Background, Assessment and   Recommendations(SBAR). Information from the following report(s) Nurse Handoff Report was reviewed with the receiving nurse. Opportunity for questions and clarification was provided. Assessment completed upon patient's arrival to unit and care assumed. 11:30 AM  Discharge instructions given to patient and family. Time given to ask questions and gain clarity. No questions at this time. Patient and family confirm understanding of instructions given. Patient given copy of discharge instructions to take home. 11:55 AM  Pt discharged via wheelchair with daughter-in-law. Personal belongings with patient upon discharge.

## 2023-11-30 ENCOUNTER — TELEPHONE (OUTPATIENT)
Age: 83
End: 2023-11-30

## 2023-11-30 RX ORDER — LOSARTAN POTASSIUM 25 MG/1
25 TABLET ORAL DAILY
Qty: 30 TABLET | Refills: 5 | Status: SHIPPED | OUTPATIENT
Start: 2023-11-30

## 2023-11-30 NOTE — TELEPHONE ENCOUNTER
Verified patient with two types of identifiers. S/P DCCV on 11/20, patient started on Flecainide and Metoprolol then. BP last night as per below 86/53. This morning systolic pressure 511 but could not recall diastolic. Could not give any other readings. He says that is has been gradually decreasing since meds. He has had constipation since starting but otherwise says he has been feeling OK. Denied lightheadedness, Fatigue, LOC. Patient advised this would be discussed with Dr Ralf Melo and I will return call. Patient verbalized understanding and will call with any other questions.

## 2023-11-30 NOTE — TELEPHONE ENCOUNTER
Verified patient with two types of identifiers. Spoke with patient and gave instructions for miralax. patient states that he did start taking this yesterday but has not had relief yet. Advised him to continue taking for the next few days and to hydrate and to let us know if he does not have relief in a week. Patient verbalized understanding and will call with any other questions.

## 2023-11-30 NOTE — TELEPHONE ENCOUNTER
Verified patient with two types of identifiers. Notified patient of NP recommendations. He states he needs a refill so will send in for Losartan 25 mg daily. Verified pharmacy. He is looking for recommendations for his constipation. He thinks its coming from his cardiac medications. He has not had a BM in 4 days. He has tried Metamucil & Miralax with prune juice and a stick of butter. Will ask NP for any other recommendations.  Notified that he may need to follow up with PCP

## 2023-11-30 NOTE — TELEPHONE ENCOUNTER
Is he using Miralax daily? Recommend using a full capful daily & hydrate well. He can also use docusate sodium 100 mg po daily or even bid as a stool softener as well. If these measures don't improve things, would recommend eval by PCP.

## 2023-11-30 NOTE — TELEPHONE ENCOUNTER
Pt is calling because his BP was 83/56 P 53 last night. Pt said the doctor told him to stop the toprolol 25 mg is his BP dropped low. Pt had a procedure done 10 days ago. Pt took it this morning around 7:30 am and it was about 108. Pt said his BP drops lower during the course of the day.     [phone]

## 2023-12-11 ENCOUNTER — OFFICE VISIT (OUTPATIENT)
Age: 83
End: 2023-12-11
Payer: MEDICARE

## 2023-12-11 ENCOUNTER — PROCEDURE VISIT (OUTPATIENT)
Age: 83
End: 2023-12-11

## 2023-12-11 VITALS
HEIGHT: 70 IN | SYSTOLIC BLOOD PRESSURE: 110 MMHG | OXYGEN SATURATION: 95 % | RESPIRATION RATE: 16 BRPM | BODY MASS INDEX: 25.14 KG/M2 | HEART RATE: 62 BPM | DIASTOLIC BLOOD PRESSURE: 68 MMHG | WEIGHT: 175.6 LBS

## 2023-12-11 DIAGNOSIS — Z95.0 PRESENCE OF CARDIAC PACEMAKER: Primary | ICD-10-CM

## 2023-12-11 DIAGNOSIS — Z79.01 ANTICOAGULATED: ICD-10-CM

## 2023-12-11 DIAGNOSIS — Z95.0 PACEMAKER: ICD-10-CM

## 2023-12-11 DIAGNOSIS — I48.92 ATRIAL FLUTTER WITH RAPID VENTRICULAR RESPONSE (HCC): Primary | ICD-10-CM

## 2023-12-11 PROCEDURE — 93010 ELECTROCARDIOGRAM REPORT: CPT | Performed by: NURSE PRACTITIONER

## 2023-12-11 PROCEDURE — 93005 ELECTROCARDIOGRAM TRACING: CPT | Performed by: NURSE PRACTITIONER

## 2023-12-11 PROCEDURE — 99214 OFFICE O/P EST MOD 30 MIN: CPT | Performed by: NURSE PRACTITIONER

## 2023-12-11 PROCEDURE — 1123F ACP DISCUSS/DSCN MKR DOCD: CPT | Performed by: NURSE PRACTITIONER

## 2023-12-11 PROCEDURE — G8484 FLU IMMUNIZE NO ADMIN: HCPCS | Performed by: NURSE PRACTITIONER

## 2023-12-11 PROCEDURE — 1036F TOBACCO NON-USER: CPT | Performed by: NURSE PRACTITIONER

## 2023-12-11 PROCEDURE — G8419 CALC BMI OUT NRM PARAM NOF/U: HCPCS | Performed by: NURSE PRACTITIONER

## 2023-12-11 PROCEDURE — G8427 DOCREV CUR MEDS BY ELIG CLIN: HCPCS | Performed by: NURSE PRACTITIONER

## 2023-12-11 RX ORDER — BUPROPION HYDROCHLORIDE 150 MG/1
TABLET, EXTENDED RELEASE ORAL
COMMUNITY
Start: 2023-10-27

## 2023-12-11 RX ORDER — METOPROLOL SUCCINATE 25 MG/1
12.5 TABLET, EXTENDED RELEASE ORAL DAILY
Qty: 30 TABLET | Refills: 3 | Status: SHIPPED
Start: 2023-12-11

## 2023-12-26 DIAGNOSIS — I48.0 PAROXYSMAL ATRIAL FIBRILLATION (HCC): ICD-10-CM

## 2023-12-27 RX ORDER — APIXABAN 5 MG/1
5 TABLET, FILM COATED ORAL 2 TIMES DAILY
Qty: 180 TABLET | Refills: 1 | Status: SHIPPED | OUTPATIENT
Start: 2023-12-27

## 2023-12-27 NOTE — TELEPHONE ENCOUNTER
Requested Prescriptions     Signed Prescriptions Disp Refills    apixaban (ELIQUIS) 5 MG TABS tablet 180 tablet 1     Sig: TAKE 1 TABLET BY MOUTH TWICE A DAY     Authorizing Provider: Skyler Chris     Ordering User: Star Jane     Verbal order per Skyler Chris NP.     Future Appointments   Date Time Provider [address]00  [address]   3/13/2024  9:40 AM MD SCAR Wolf   11/8/2024 10:40 AM LILIBETH METCALF   11/8/2024 11:00 AM Skyler Chris, APRN - NP SCAR SHER AMB

## 2024-02-15 RX ORDER — FLECAINIDE ACETATE 50 MG/1
50 TABLET ORAL 2 TIMES DAILY
Qty: 180 TABLET | Refills: 1 | Status: SHIPPED | OUTPATIENT
Start: 2024-02-15

## 2024-02-15 NOTE — TELEPHONE ENCOUNTER
Requested Prescriptions     Signed Prescriptions Disp Refills    flecainide (TAMBOCOR) 50 MG tablet 180 tablet 1     Sig: TAKE 1 TABLET BY MOUTH TWICE A DAY     Authorizing Provider: MARQUIS RAMOS     Ordering User: CIARA CONDE       Med refilled per VO by MD.    Future Appointments   Date Time Provider Department Center   3/13/2024  9:40 AM Marquis Ramos MD [facility]   11/8/2024 10:40 AM PACEMAKER, STFRANCES [facility]   11/8/2024 11:00 AM Jaylene Cabello APRN - TAMIR SINGLETON BS AMB

## 2024-03-13 ENCOUNTER — OFFICE VISIT (OUTPATIENT)
Age: 84
End: 2024-03-13
Payer: MEDICARE

## 2024-03-13 VITALS
RESPIRATION RATE: 16 BRPM | HEART RATE: 78 BPM | DIASTOLIC BLOOD PRESSURE: 78 MMHG | OXYGEN SATURATION: 95 % | SYSTOLIC BLOOD PRESSURE: 122 MMHG | HEIGHT: 70 IN | BODY MASS INDEX: 24.95 KG/M2 | WEIGHT: 174.3 LBS

## 2024-03-13 DIAGNOSIS — I48.3 TYPICAL ATRIAL FLUTTER (HCC): ICD-10-CM

## 2024-03-13 DIAGNOSIS — Z79.01 ANTICOAGULATED: ICD-10-CM

## 2024-03-13 DIAGNOSIS — I10 PRIMARY HYPERTENSION: ICD-10-CM

## 2024-03-13 DIAGNOSIS — Z95.0 PRESENCE OF CARDIAC PACEMAKER: Primary | ICD-10-CM

## 2024-03-13 PROBLEM — I50.22 CHRONIC SYSTOLIC (CONGESTIVE) HEART FAILURE (HCC): Status: ACTIVE | Noted: 2024-03-13

## 2024-03-13 PROCEDURE — 93005 ELECTROCARDIOGRAM TRACING: CPT | Performed by: INTERNAL MEDICINE

## 2024-03-13 PROCEDURE — 99215 OFFICE O/P EST HI 40 MIN: CPT | Performed by: INTERNAL MEDICINE

## 2024-03-13 NOTE — PROGRESS NOTES
Room #: EP 2    Chief Complaint   Patient presents with    Irregular Heart Beat     AFlutter     /78 (Site: Left Upper Arm, Position: Sitting, Cuff Size: Medium Adult)   Pulse 78   Resp 16   Ht 1.778 m (5' 10\")   Wt 79.1 kg (174 lb 4.8 oz)   SpO2 95%   BMI 25.01 kg/m²     Chest pain: NO       1. Have you been to the ER, urgent care clinic outside of [facility] since your last visit?  Hospitalized since your last visit?  NO        Refills:  NO  
  Musculoskeletal: normal range of motion   Neurological:  Alert and oriented, no focal neurologic deficits   Psychiatric:  Normal mood and affect       Medications:     Current Outpatient Medications   Medication Sig   · flecainide (TAMBOCOR) 50 MG tablet TAKE 1 TABLET BY MOUTH TWICE A DAY   · apixaban (ELIQUIS) 5 MG TABS tablet TAKE 1 TABLET BY MOUTH TWICE A DAY   · buPROPion (WELLBUTRIN SR) 150 MG extended release tablet TAKE 1 TABLET BY MOUTH EVERY DAY IN THE MORNING FOR 90 DAYS   · metoprolol succinate (TOPROL XL) 25 MG extended release tablet Take 0.5 tablets by mouth daily   · Cholecalciferol 50 MCG (2000 UT) TABS Take 1 tablet by mouth daily   · furosemide (LASIX) 20 MG tablet TAKE 1 TABLET BY MOUTH EVERY DAY FOR 30 DAYS   · Melatonin 5 MG CAPS Take 5 mg by mouth   · rosuvastatin (CRESTOR) 20 MG tablet Take 1 tablet by mouth   · tamsulosin (FLOMAX) 0.4 MG capsule Take 1 capsule by mouth daily     No current facility-administered medications for this visit.      Diagnostic Data Reviewed    Lab Results   Component Value Date/Time   BUN 17 11/20/2023 10:25 AM     Lab Results   Component Value Date/Time   K 3.7 11/20/2023 10:25 AM     No results found for: \"HBA1C\"   Lab Results   Component Value Date/Time   HGB 13.6 10/10/2022 02:27 PM     Lab Results   Component Value Date/Time    10/10/2022 02:27 PM       Future Appointments   Date Time Provider Department Center   11/8/2024 10:40 AM PACEMAKER, STFRANCES [facility]   11/8/2024 11:00 AM Jaylene Cabello, WILY - NP [facility] Ramos M.D.   Electrophysiology/Cardiology   [facility]   [address] [facility]                      [address] [facility]   [place] [address]                             [facility] [address] [phone]

## 2024-03-19 ENCOUNTER — TELEPHONE (OUTPATIENT)
Age: 84
End: 2024-03-19

## 2024-03-19 DIAGNOSIS — I48.3 TYPICAL ATRIAL FLUTTER (HCC): Primary | ICD-10-CM

## 2024-03-19 NOTE — TELEPHONE ENCOUNTER
Verified patient with two types of identifiers.   Spoke with patient and discussed scheduling AFL RFA. Pt agrees to 4/29 @ 3:00pm. Advised I would send further instructions through [employer].   Patient verbalized understanding and will call with any other questions.

## 2024-04-16 ENCOUNTER — OFFICE VISIT (OUTPATIENT)
Age: 84
End: 2024-04-16
Payer: MEDICARE

## 2024-04-16 VITALS
RESPIRATION RATE: 14 BRPM | BODY MASS INDEX: 23.65 KG/M2 | WEIGHT: 165.2 LBS | HEIGHT: 70 IN | DIASTOLIC BLOOD PRESSURE: 80 MMHG | SYSTOLIC BLOOD PRESSURE: 136 MMHG | OXYGEN SATURATION: 96 %

## 2024-04-16 DIAGNOSIS — Z79.01 ANTICOAGULATED: ICD-10-CM

## 2024-04-16 DIAGNOSIS — I48.3 TYPICAL ATRIAL FLUTTER (HCC): ICD-10-CM

## 2024-04-16 DIAGNOSIS — I10 PRIMARY HYPERTENSION: ICD-10-CM

## 2024-04-16 DIAGNOSIS — Z95.0 PRESENCE OF CARDIAC PACEMAKER: ICD-10-CM

## 2024-04-16 DIAGNOSIS — I48.3 TYPICAL ATRIAL FLUTTER (HCC): Primary | ICD-10-CM

## 2024-04-16 DIAGNOSIS — I50.22 CHRONIC SYSTOLIC (CONGESTIVE) HEART FAILURE (HCC): ICD-10-CM

## 2024-04-16 PROCEDURE — G8427 DOCREV CUR MEDS BY ELIG CLIN: HCPCS

## 2024-04-16 PROCEDURE — 3075F SYST BP GE 130 - 139MM HG: CPT

## 2024-04-16 PROCEDURE — 1036F TOBACCO NON-USER: CPT

## 2024-04-16 PROCEDURE — 3079F DIAST BP 80-89 MM HG: CPT

## 2024-04-16 PROCEDURE — 99214 OFFICE O/P EST MOD 30 MIN: CPT

## 2024-04-16 PROCEDURE — 1123F ACP DISCUSS/DSCN MKR DOCD: CPT

## 2024-04-16 PROCEDURE — G8420 CALC BMI NORM PARAMETERS: HCPCS

## 2024-04-16 NOTE — PROGRESS NOTES
Cardiac Electrophysiology Office Follow-up    NAME:  Zaire Antonio   :  1940  MRM:  [id number]    Date:  2024     Primary Cardiologist: burton    Assessment and Plan:     1. Typical atrial flutter (HCC)  -     Basic Metabolic Panel; Future  -     CBC; Future  2. Chronic systolic (congestive) heart failure (HCC)  -     Basic Metabolic Panel; Future  -     CBC; Future  3. Presence of cardiac pacemaker  -     Basic Metabolic Panel; Future  -     CBC; Future  4. Anticoagulated  -     Basic Metabolic Panel; Future  -     CBC; Future  5. Primary hypertension  -     Basic Metabolic Panel; Future  -     CBC; Future       Atrial flutter: EKG showed typical AFL and V pacing 3/13/2024  - Nuclear stress test 2022 showed no evidence of ischemia   - Echo 10/10/22: EF 55%, no significant valvular disease, small concentric pericardial effusion without tamponade.   - 100% burden via device   - S/p cardioversion 23. Started on flecainide and Toprol at that time, now discontinued due to constipation.   - Scheduled for AFL ablation on 5/10/2024  - Obtain labs prior to procedure (CBC, CMP)    Anticoagulation: On Eliquis 5 mg BID for embolic CVA prophylaxis. Denies of any bleeding issues. Know to contact clinic with any bleeding side effects (BRBPR, melena, hemoptysis, hematuria).    Medtronic dual chamber pacemaker (DOI 2022): Remote device check 24 showed proper lead & generator function. Generator longevity estimated 6.4 years. RA 1.3% RV 69.7%. AT/AF burden 100%. No sustained VT/VF.  Iterative programming was performed to test the leads sensing and threshold parameters without any permanent changes.  - Continue remote device transmissions every 3 months  - Follow-up in EP clinic     Hypertension: BP is well controlled on the current regimen. No change in antihypertensive medications today. Recommended routine exercise and low sodium diet.      Subjective:     Zaire Antonio, a 83 y.o. year-old who

## 2024-04-16 NOTE — H&P (VIEW-ONLY)
Cardiac Electrophysiology Office Follow-up    NAME:  Zaire Antonio   :  1940  MRM:  [id number]    Date:  2024     Primary Cardiologist: burton    Assessment and Plan:     1. Typical atrial flutter (HCC)  -     Basic Metabolic Panel; Future  -     CBC; Future  2. Chronic systolic (congestive) heart failure (HCC)  -     Basic Metabolic Panel; Future  -     CBC; Future  3. Presence of cardiac pacemaker  -     Basic Metabolic Panel; Future  -     CBC; Future  4. Anticoagulated  -     Basic Metabolic Panel; Future  -     CBC; Future  5. Primary hypertension  -     Basic Metabolic Panel; Future  -     CBC; Future       Atrial flutter: EKG showed typical AFL and V pacing 3/13/2024  - Nuclear stress test 2022 showed no evidence of ischemia   - Echo 10/10/22: EF 55%, no significant valvular disease, small concentric pericardial effusion without tamponade.   - 100% burden via device   - S/p cardioversion 23. Started on flecainide and Toprol at that time, now discontinued due to constipation.   - Scheduled for AFL ablation on 5/10/2024  - Obtain labs prior to procedure (CBC, CMP)    Anticoagulation: On Eliquis 5 mg BID for embolic CVA prophylaxis. Denies of any bleeding issues. Know to contact clinic with any bleeding side effects (BRBPR, melena, hemoptysis, hematuria).    Medtronic dual chamber pacemaker (DOI 2022): Remote device check 24 showed proper lead & generator function. Generator longevity estimated 6.4 years. RA 1.3% RV 69.7%. AT/AF burden 100%. No sustained VT/VF.  Iterative programming was performed to test the leads sensing and threshold parameters without any permanent changes.  - Continue remote device transmissions every 3 months  - Follow-up in EP clinic     Hypertension: BP is well controlled on the current regimen. No change in antihypertensive medications today. Recommended routine exercise and low sodium diet.      Subjective:     Zaire Antonio, a 83 y.o. year-old who

## 2024-04-16 NOTE — PROGRESS NOTES
Room #:  5    Chief Complaint   Patient presents with    Irregular Heart Beat     AFlutter     /80 (Site: Left Upper Arm, Position: Sitting, Cuff Size: Medium Adult)   Resp 14   Ht 1.778 m (5' 10\")   Wt 74.9 kg (165 lb 3.2 oz)   SpO2 96%   BMI 23.70 kg/m²         Chest pain: NO       1. Have you been to the ER, urgent care clinic outside [facility] since your last visit?  Hospitalized since your last visit?  NO        Refills:  NO

## 2024-04-17 LAB
ANION GAP SERPL CALC-SCNC: 5 MMOL/L (ref 5–15)
BUN SERPL-MCNC: 23 MG/DL (ref 6–20)
BUN/CREAT SERPL: 21 (ref 12–20)
CALCIUM SERPL-MCNC: 9.4 MG/DL (ref 8.5–10.1)
CHLORIDE SERPL-SCNC: 106 MMOL/L (ref 97–108)
CO2 SERPL-SCNC: 31 MMOL/L (ref 21–32)
CREAT SERPL-MCNC: 1.07 MG/DL (ref 0.7–1.3)
ERYTHROCYTE [DISTWIDTH] IN BLOOD BY AUTOMATED COUNT: 13.3 % (ref 11.5–14.5)
GLUCOSE SERPL-MCNC: 103 MG/DL (ref 65–100)
HCT VFR BLD AUTO: 44.1 % (ref 36.6–50.3)
HGB BLD-MCNC: 14.5 G/DL (ref 12.1–17)
MCH RBC QN AUTO: 31.2 PG (ref 26–34)
MCHC RBC AUTO-ENTMCNC: 32.9 G/DL (ref 30–36.5)
MCV RBC AUTO: 94.8 FL (ref 80–99)
NRBC # BLD: 0 K/UL (ref 0–0.01)
NRBC BLD-RTO: 0 PER 100 WBC
PLATELET # BLD AUTO: 170 K/UL (ref 150–400)
PMV BLD AUTO: 9.6 FL (ref 8.9–12.9)
POTASSIUM SERPL-SCNC: 4.2 MMOL/L (ref 3.5–5.1)
RBC # BLD AUTO: 4.65 M/UL (ref 4.1–5.7)
SODIUM SERPL-SCNC: 142 MMOL/L (ref 136–145)
WBC # BLD AUTO: 5.6 K/UL (ref 4.1–11.1)

## 2024-05-02 ENCOUNTER — PREP FOR PROCEDURE (OUTPATIENT)
Age: 84
End: 2024-05-02

## 2024-05-02 RX ORDER — SODIUM CHLORIDE 0.9 % (FLUSH) 0.9 %
5-40 SYRINGE (ML) INJECTION EVERY 12 HOURS SCHEDULED
Status: CANCELLED | OUTPATIENT
Start: 2024-05-02

## 2024-05-02 RX ORDER — SODIUM CHLORIDE 0.9 % (FLUSH) 0.9 %
5-40 SYRINGE (ML) INJECTION PRN
Status: CANCELLED | OUTPATIENT
Start: 2024-05-02

## 2024-05-02 RX ORDER — SODIUM CHLORIDE 9 MG/ML
INJECTION, SOLUTION INTRAVENOUS PRN
Status: CANCELLED | OUTPATIENT
Start: 2024-05-02

## 2024-05-09 NOTE — DISCHARGE INSTRUCTIONS
Take toprol for blood pressure control  Resume eliquis tonight and as usual    Patient Instructions Post-EP Study and Ablation    1.  No heavy lifting or exercises for 1 week.  This includes the following:  Do not push or move furniture, jog or run    2.  Do not drive for 3 days.    3.  Call Dr. Ramos at [phone] if you experience any of the following symptoms:  Dizziness, lightheadedness, fainting spells  Lack of energy  Shortness of breath  Rapid heart rate  Chest or muscle twitches  Blurry vision, double vision, weakness, numbness  Nausea, vomiting  Fever  Bleeding in the stool, black stool  Leg swelling, pain    4.  Follow-up with Dr. Ramos's NP as noted below.      Future Appointments   Date Time Provider Department Center   5/24/2024 10:40 AM Naomi Carter APRN - CNP [facility]   11/8/2024 10:40 AM PACEMAKER, STFRANCES [facility]   11/8/2024 11:00 AM Jaylene Cabello APRN - NP [facility] AMB       Diogenes Ramos M.D.  Electrophysiology/Cardiology  [facility]  [address] [place] [address] [phone]

## 2024-05-10 ENCOUNTER — HOSPITAL ENCOUNTER (OUTPATIENT)
Facility: HOSPITAL | Age: 84
Setting detail: OUTPATIENT SURGERY
Discharge: HOME | End: 2024-05-10
Attending: INTERNAL MEDICINE | Admitting: INTERNAL MEDICINE
Payer: MEDICARE

## 2024-05-10 VITALS
HEIGHT: 70 IN | TEMPERATURE: 98.1 F | RESPIRATION RATE: 22 BRPM | HEART RATE: 82 BPM | WEIGHT: 174.6 LBS | SYSTOLIC BLOOD PRESSURE: 129 MMHG | OXYGEN SATURATION: 95 % | DIASTOLIC BLOOD PRESSURE: 94 MMHG | BODY MASS INDEX: 25 KG/M2

## 2024-05-10 DIAGNOSIS — I48.3 TYPICAL ATRIAL FLUTTER (HCC): ICD-10-CM

## 2024-05-10 PROBLEM — Z98.890 STATUS POST CATHETER ABLATION OF ATRIAL FLUTTER: Status: ACTIVE | Noted: 2024-05-10

## 2024-05-10 LAB — ECHO BSA: 1.98 M2

## 2024-05-10 PROCEDURE — 99153 MOD SED SAME PHYS/QHP EA: CPT | Performed by: INTERNAL MEDICINE

## 2024-05-10 PROCEDURE — 93613 INTRACARDIAC EPHYS 3D MAPG: CPT | Performed by: INTERNAL MEDICINE

## 2024-05-10 PROCEDURE — 93653 COMPRE EP EVAL TX SVT: CPT | Performed by: INTERNAL MEDICINE

## 2024-05-10 PROCEDURE — 99152 MOD SED SAME PHYS/QHP 5/>YRS: CPT | Performed by: INTERNAL MEDICINE

## 2024-05-10 PROCEDURE — 76937 US GUIDE VASCULAR ACCESS: CPT | Performed by: INTERNAL MEDICINE

## 2024-05-10 PROCEDURE — 93620 COMP EP EVL R AT VEN PAC&REC: CPT | Performed by: INTERNAL MEDICINE

## 2024-05-10 PROCEDURE — C1893 INTRO/SHEATH, FIXED,NON-PEEL: HCPCS | Performed by: INTERNAL MEDICINE

## 2024-05-10 PROCEDURE — 2709999900 HC NON-CHARGEABLE SUPPLY: Performed by: INTERNAL MEDICINE

## 2024-05-10 PROCEDURE — 2500000003 HC RX 250 WO HCPCS: Performed by: INTERNAL MEDICINE

## 2024-05-10 PROCEDURE — 93286 PERI-PX EVAL PM/LDLS PM IP: CPT | Performed by: INTERNAL MEDICINE

## 2024-05-10 PROCEDURE — 6360000002 HC RX W HCPCS: Performed by: INTERNAL MEDICINE

## 2024-05-10 PROCEDURE — C1894 INTRO/SHEATH, NON-LASER: HCPCS | Performed by: INTERNAL MEDICINE

## 2024-05-10 PROCEDURE — C1760 CLOSURE DEV, VASC: HCPCS | Performed by: INTERNAL MEDICINE

## 2024-05-10 PROCEDURE — C1730 CATH, EP, 19 OR FEW ELECT: HCPCS | Performed by: INTERNAL MEDICINE

## 2024-05-10 PROCEDURE — C1733 CATH, EP, OTHR THAN COOL-TIP: HCPCS | Performed by: INTERNAL MEDICINE

## 2024-05-10 RX ORDER — FENTANYL CITRATE 50 UG/ML
INJECTION, SOLUTION INTRAMUSCULAR; INTRAVENOUS PRN
Status: DISCONTINUED | OUTPATIENT
Start: 2024-05-10 | End: 2024-05-10 | Stop reason: HOSPADM

## 2024-05-10 RX ORDER — SODIUM CHLORIDE 0.9 % (FLUSH) 0.9 %
5-40 SYRINGE (ML) INJECTION EVERY 12 HOURS SCHEDULED
Status: DISCONTINUED | OUTPATIENT
Start: 2024-05-10 | End: 2024-05-10 | Stop reason: HOSPADM

## 2024-05-10 RX ORDER — HYDROCODONE BITARTRATE AND ACETAMINOPHEN 5; 325 MG/1; MG/1
1 TABLET ORAL EVERY 6 HOURS PRN
Status: DISCONTINUED | OUTPATIENT
Start: 2024-05-10 | End: 2024-05-10 | Stop reason: HOSPADM

## 2024-05-10 RX ORDER — MIDAZOLAM HYDROCHLORIDE 1 MG/ML
INJECTION INTRAMUSCULAR; INTRAVENOUS PRN
Status: DISCONTINUED | OUTPATIENT
Start: 2024-05-10 | End: 2024-05-10 | Stop reason: HOSPADM

## 2024-05-10 RX ORDER — LIDOCAINE HYDROCHLORIDE 10 MG/ML
INJECTION, SOLUTION INFILTRATION; PERINEURAL PRN
Status: DISCONTINUED | OUTPATIENT
Start: 2024-05-10 | End: 2024-05-10 | Stop reason: HOSPADM

## 2024-05-10 RX ORDER — METOPROLOL SUCCINATE 25 MG/1
25 TABLET, EXTENDED RELEASE ORAL DAILY
Qty: 30 TABLET | Refills: 3 | Status: SHIPPED | OUTPATIENT
Start: 2024-05-10

## 2024-05-10 RX ORDER — ACETAMINOPHEN 325 MG/1
650 TABLET ORAL EVERY 4 HOURS PRN
Status: DISCONTINUED | OUTPATIENT
Start: 2024-05-10 | End: 2024-05-10 | Stop reason: HOSPADM

## 2024-05-10 RX ORDER — ONDANSETRON 2 MG/ML
4 INJECTION INTRAMUSCULAR; INTRAVENOUS EVERY 6 HOURS PRN
Status: DISCONTINUED | OUTPATIENT
Start: 2024-05-10 | End: 2024-05-10 | Stop reason: HOSPADM

## 2024-05-10 RX ORDER — SODIUM CHLORIDE 0.9 % (FLUSH) 0.9 %
5-40 SYRINGE (ML) INJECTION PRN
Status: DISCONTINUED | OUTPATIENT
Start: 2024-05-10 | End: 2024-05-10 | Stop reason: HOSPADM

## 2024-05-10 RX ORDER — SODIUM CHLORIDE 9 MG/ML
INJECTION, SOLUTION INTRAVENOUS PRN
Status: DISCONTINUED | OUTPATIENT
Start: 2024-05-10 | End: 2024-05-10 | Stop reason: HOSPADM

## 2024-05-10 NOTE — PROCEDURES
Cardiac Procedure Note   Patient: Zaire Antonio  MRN: [id number]  SSN: [id number]   YOB: 1940 Age: 83 y.o.  Sex: male    Date of Procedure: 5/10/2024   Pre-procedure Diagnosis: typical Atrial Flutter  Post-procedure Diagnosis: same  Procedure: EP Study and Ablation atrial flutter  :  Dr. Diogenes Ramos MD    Assistant(s):  None  Anesthesia: Moderate Sedation   Estimated Blood Loss: Less than 10 mL   Specimens Removed: None  Findings: CTI AFL ablation to NSR  Bidirectional line of block 150 ms conduction delay  Complications: None   Implants:  None  Signed by:  Diogenes Ramos MD  5/10/2024  1:16 PM

## 2024-05-10 NOTE — PROGRESS NOTES
9:15 am  Patient arrived. ID and allergies verified verbally with patient. Pt voices understanding of procedure to be performed. Consent obtained. Pt prepped for procedure. Pt denies contrast allergy. Patient denies taking any blood thinners.      Eliquis  - this morning  Pacemaker      11:00 am  TRANSFER - OUT REPORT:    Verbal report given to EP on Zaire Cousins  being transferred to EP for ordered procedure       Report consisted of patient's Situation, Background, Assessment and   Recommendations(SBAR).     Information from the following report(s) Nurse Handoff Report was reviewed with the receiving nurse.           Lines:   Peripheral IV 05/10/24 Left;Proximal Forearm (Active)        Opportunity for questions and clarification was provided.      Patient transported with:  Registered Nurse        12:30 pm  TRANSFER - IN REPORT:    Verbal report received from EP on Zaire Cousins  being received from EP for routine post-op      Report consisted of patient's Situation, Background, Assessment and   Recommendations(SBAR).     Information from the following report(s) Nurse Handoff Report was reviewed with the receiving nurse.    Opportunity for questions and clarification was provided.      Assessment completed upon patient's arrival to unit and care assumed.     1:30 pm  Discharge instructions and prescriptions reviewed with  Patient and son. Opportunity provided for questions. Patient and son verbalized understanding. Signed copy of discharge placed in the front of patient's chart.     Patient ambulated tolerated well.  IV and tele removed.       2:40 pm  Patient escorted via wheelchair to entrance.  Son driving. Patient discharged into care of SON.

## 2024-05-10 NOTE — INTERVAL H&P NOTE
Update History & Physical    The patient's History and Physical of April 16, 2024 was reviewed with the patient and I examined the patient. There was agreement with atrial flutter ablation. The surgical site was confirmed by the patient and me.     Plan: The risks, benefits, expected outcome, and alternative to the recommended procedure have been discussed with the patient. Patient understands and wants to proceed with the procedure.     Electronically signed by Diogenes Ramos MD on 5/10/2024 at 9:51 AM

## 2024-05-10 NOTE — PROGRESS NOTES
12:31 PM    TRANSFER - IN REPORT:    Verbal report received from Pooja ATKINSON  on Zaire Cousins  being received from EP Lab for routine post-op      Report consisted of patient's Situation, Background, Assessment and   Recommendations(SBAR).     Information from the following report(s) Nurse Handoff Report was reviewed with the receiving nurse.    Opportunity for questions and clarification was provided.      Assessment completed upon patient's arrival to unit and care assumed.

## 2024-05-21 NOTE — PROGRESS NOTES
Cardiac Electrophysiology Office Follow-up    NAME:  Zaire Antonio   :  1940  MRM:  [id number]    Date:  2024     Primary Cardiologist: josephine    Assessment and Plan:     1. Typical atrial flutter (HCC)  -     EKG 12 Lead  2. Presence of cardiac pacemaker  -     EKG 12 Lead  3. Primary hypertension  -     EKG 12 Lead  4. Anticoagulated  -     EKG 12 Lead  5. Status post catheter ablation of atrial flutter       Atrial flutter: EKG showed typical AFL and V pacing 3/13/2024  - Nuclear stress test 2022 showed no evidence of ischemia   - Echo 10/10/22: EF 55%, no significant valvular disease, small concentric pericardial effusion without tamponade.   - 100% burden via device   - S/p cardioversion 23. Started on flecainide and Toprol at that time, now discontinued due to constipation.   - Now s/p AFL ablation on 5/10/2024  - EKG today AV paced  - Decrease metoprolol to 12.5 mg daily due to hypotension  - Monitor AFL burden through device. Remote device check from  with 0% AF/AFL burden.   - Follow-up with Dr. Ramos in 6 months    Anticoagulation: On Eliquis 5 mg BID for embolic CVA prophylaxis, he does report bruising. Denies of any bleeding issues. Know to contact clinic with any bleeding side effects (BRBPR, melena, hemoptysis, hematuria).    Medtronic dual chamber pacemaker (DOI 2022): Remote device check 24 showed proper lead & generator function. Generator longevity estimated 6.4 years. RA 1.3% RV 69.7%. AT/AF burden 100%. No sustained VT/VF.  Iterative programming was performed to test the leads sensing and threshold parameters without any permanent changes.  - Continue remote device transmissions every 3 months  - Follow-up in EP clinic     Hypertension: BP WNL today, but patient reports at home it has been as low as SBP 90's and he has symptoms of dizziness and lightheadedness. No change in antihypertensive medications today. Recommended routine exercise and low sodium

## 2024-05-28 ENCOUNTER — OFFICE VISIT (OUTPATIENT)
Age: 84
End: 2024-05-28
Payer: MEDICARE

## 2024-05-28 VITALS
WEIGHT: 164.8 LBS | DIASTOLIC BLOOD PRESSURE: 82 MMHG | BODY MASS INDEX: 23.59 KG/M2 | HEIGHT: 70 IN | OXYGEN SATURATION: 96 % | RESPIRATION RATE: 14 BRPM | SYSTOLIC BLOOD PRESSURE: 124 MMHG | HEART RATE: 60 BPM

## 2024-05-28 DIAGNOSIS — I10 PRIMARY HYPERTENSION: ICD-10-CM

## 2024-05-28 DIAGNOSIS — Z98.890 STATUS POST CATHETER ABLATION OF ATRIAL FLUTTER: ICD-10-CM

## 2024-05-28 DIAGNOSIS — I48.3 TYPICAL ATRIAL FLUTTER (HCC): Primary | ICD-10-CM

## 2024-05-28 DIAGNOSIS — Z79.01 ANTICOAGULATED: ICD-10-CM

## 2024-05-28 DIAGNOSIS — Z95.0 PRESENCE OF CARDIAC PACEMAKER: ICD-10-CM

## 2024-05-28 PROCEDURE — 93005 ELECTROCARDIOGRAM TRACING: CPT

## 2024-05-28 PROCEDURE — 3074F SYST BP LT 130 MM HG: CPT

## 2024-05-28 PROCEDURE — 93010 ELECTROCARDIOGRAM REPORT: CPT

## 2024-05-28 PROCEDURE — G8427 DOCREV CUR MEDS BY ELIG CLIN: HCPCS

## 2024-05-28 PROCEDURE — 99214 OFFICE O/P EST MOD 30 MIN: CPT

## 2024-05-28 PROCEDURE — 1036F TOBACCO NON-USER: CPT

## 2024-05-28 PROCEDURE — 3079F DIAST BP 80-89 MM HG: CPT

## 2024-05-28 PROCEDURE — G8420 CALC BMI NORM PARAMETERS: HCPCS

## 2024-05-28 PROCEDURE — 1123F ACP DISCUSS/DSCN MKR DOCD: CPT

## 2024-05-28 RX ORDER — METOPROLOL SUCCINATE 25 MG/1
12.5 TABLET, EXTENDED RELEASE ORAL DAILY
Qty: 15 TABLET | Refills: 3 | Status: SHIPPED | OUTPATIENT
Start: 2024-05-28

## 2024-05-28 NOTE — PROGRESS NOTES
Room #: EP 5    Chief Complaint   Patient presents with    Irregular Heart Beat     AFlutter    Follow-up     AFlutter Ablation with Dr. Ramos on 5/10/24     /82 (Site: Left Upper Arm, Position: Sitting, Cuff Size: Medium Adult)   Pulse 60   Resp 14   Ht 1.778 m (5' 10\")   Wt 74.8 kg (164 lb 12.8 oz)   SpO2 96%   BMI 23.65 kg/m²         Chest pain: NO       1. Have you been to the ER, urgent care clinic outside [facility] since your last visit?  Hospitalized since your last visit?  NO        Refills:  NO

## 2024-05-29 ENCOUNTER — CLINICAL DOCUMENTATION (OUTPATIENT)
Age: 84
End: 2024-05-29

## 2024-05-29 NOTE — PROGRESS NOTES
He bruises easily  He wants to sop OAC after AFL ablation  CHADS 2 vasc score 3  I recommend him consider watchman device   If he resists then ok but it is high risk for stroke when he has PAF

## 2024-06-26 DIAGNOSIS — I48.0 PAROXYSMAL ATRIAL FIBRILLATION (HCC): ICD-10-CM

## 2024-06-26 RX ORDER — APIXABAN 5 MG/1
5 TABLET, FILM COATED ORAL 2 TIMES DAILY
Qty: 60 TABLET | Refills: 5 | Status: SHIPPED | OUTPATIENT
Start: 2024-06-26

## 2024-06-26 NOTE — TELEPHONE ENCOUNTER
Med refilled per VO by MD.    Future Appointments   Date Time Provider Department Center   11/8/2024 10:40 AM PACEMAKER, STMANI [facility] AM[facility]   11/8/2024 11:00 AM Jaylene Cabello APRN - NP [facility]   12/4/2024 10:40 AM Diogenes Ramos MD [facility] AMB

## 2024-07-07 PROCEDURE — 93294 REM INTERROG EVL PM/LDLS PM: CPT | Performed by: INTERNAL MEDICINE

## 2024-07-29 ENCOUNTER — TELEPHONE (OUTPATIENT)
Age: 84
End: 2024-07-29

## 2024-07-29 NOTE — TELEPHONE ENCOUNTER
Verified patient with two types of identifiers.  Patient states that Dr. Ramos has mentioned being able to stop blood thinner post AFL ablation.  Per Dr. Ramos's documentation, Watchman is recommended.  Discussed Watchman with patient, but he is not interested in having another procedure.  He says he would rather take the blood thinner if necessary.  Will follow up with Dr. Ramos regarding blood thinner recommendations and return call.  Also informed patient that follow up appointment with Dr. Ramos has been rescheduled, and appointment with Jaylene GARNETT cancelled.  Patient verbalized understanding and will call with any other questions.        Future Appointments   Date Time Provider Department Center   11/20/2024  1:00 PM PACEMAKER, [facility]   11/20/2024  1:20 PM Diogenes Ramos MD [facility]

## 2024-07-29 NOTE — TELEPHONE ENCOUNTER
Patient states he is on blood thinner, and he says he had an ablation done and was told once it was done he could get off of his blood thinners and he just wants to clarify with the nurse.      Phone [phone]

## 2024-08-02 RX ORDER — METOPROLOL SUCCINATE 25 MG/1
12.5 TABLET, EXTENDED RELEASE ORAL DAILY
Qty: 45 TABLET | Refills: 1 | Status: SHIPPED | OUTPATIENT
Start: 2024-08-02

## 2024-08-02 NOTE — TELEPHONE ENCOUNTER
Med refilled per VO by MD.    Future Appointments   Date Time Provider Department Center   11/20/2024  1:00 PM PACEMAKER, LILIBETH [facility]   11/20/2024  1:20 PM Diogenes Ramos MD [facility]

## 2024-11-12 ENCOUNTER — TELEPHONE (OUTPATIENT)
Age: 84
End: 2024-11-12

## 2024-11-12 NOTE — TELEPHONE ENCOUNTER
Spoke to patient and confirmed new appointment provider and time on 11/20.     Future Appointments   Date Time Provider Department Center   11/20/2024  1:40 PM PACEMAKER, [facility]ILIB[facility]   11/20/2024  2:00 PM Jaylene Cabello APRN - NP [facility]       [facility]

## 2024-11-19 NOTE — PROGRESS NOTES
Cardiac Electrophysiology Office Follow-up    NAME:  Zaire Antonio   :  1940  MRM:  [id number]    Date:  2024     Primary Cardiologist: josephine    Assessment and Plan:     1. Typical atrial flutter (HCC)  2. Anticoagulated  3. Pacemaker         Atrial flutter:    -s/p AFL ablation on 5/10/24   -continue to monitor AFL burden through device. Device check from today with 0.2% AF/AFL burden.      -continue metoprolol. No episodes of hypotension, dizziness, syncope reported.    -fu with Dr. Ramos in one year.    Medtronic dual chamber pacemaker (DOI 2022):   -Device check today showed proper lead & generator function. Generator longevity estimated 4.0 years. RA 79.8% RV 99.7%.  - Continue remote device transmissions every 3 months  - Follow-up in EP clinic   -EKG showed typical AFL and V pacing 3/13/2024  - Nuclear stress test 2022 showed no evidence of ischemia   - Echo 10/10/22: EF 55%, no significant valvular disease, small concentric pericardial effusion without tamponade.   - 100% burden via device   - S/p cardioversion 23. Started on flecainide and Toprol at that time, now discontinued due to constipation.   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 He does report bruising. Denies of any bleeding issues. Discussed risk v. Benefit of anticoagulants with patient. Know to contact clinic with any bleeding side effects (BRBPR, melena, hemoptysis, hematuria).  -discussed watchman again with patient today but still prefers oral anticoagulation over a procedure.    Hypertension:    -BP WNL   -No change in antihypertensive medications today.    -Recommended routine exercise and low sodium diet.      Subjective:     Zaire Antonio, a 84 y.o. year-old who presents for 6 month follow up from AF ablation (05/10/24)     Medtronic dual chamber pacemaker implant (DOI 2022) .      He reports feeling well overall. Denies

## 2024-11-20 ENCOUNTER — OFFICE VISIT (OUTPATIENT)
Age: 84
End: 2024-11-20
Payer: MEDICARE

## 2024-11-20 ENCOUNTER — PROCEDURE VISIT (OUTPATIENT)
Age: 84
End: 2024-11-20
Payer: MEDICARE

## 2024-11-20 VITALS
OXYGEN SATURATION: 96 % | SYSTOLIC BLOOD PRESSURE: 138 MMHG | DIASTOLIC BLOOD PRESSURE: 88 MMHG | HEIGHT: 70 IN | HEART RATE: 68 BPM | BODY MASS INDEX: 23.28 KG/M2 | WEIGHT: 162.6 LBS

## 2024-11-20 DIAGNOSIS — Z95.0 PRESENCE OF CARDIAC PACEMAKER: Primary | ICD-10-CM

## 2024-11-20 DIAGNOSIS — Z79.01 ANTICOAGULATED: ICD-10-CM

## 2024-11-20 DIAGNOSIS — Z95.0 PACEMAKER: ICD-10-CM

## 2024-11-20 DIAGNOSIS — I48.3 TYPICAL ATRIAL FLUTTER (HCC): Primary | ICD-10-CM

## 2024-11-20 PROCEDURE — 1159F MED LIST DOCD IN RCRD: CPT | Performed by: NURSE PRACTITIONER

## 2024-11-20 PROCEDURE — G8427 DOCREV CUR MEDS BY ELIG CLIN: HCPCS | Performed by: NURSE PRACTITIONER

## 2024-11-20 PROCEDURE — G8484 FLU IMMUNIZE NO ADMIN: HCPCS | Performed by: NURSE PRACTITIONER

## 2024-11-20 PROCEDURE — 99214 OFFICE O/P EST MOD 30 MIN: CPT | Performed by: NURSE PRACTITIONER

## 2024-11-20 PROCEDURE — G8420 CALC BMI NORM PARAMETERS: HCPCS | Performed by: NURSE PRACTITIONER

## 2024-11-20 PROCEDURE — 93280 PM DEVICE PROGR EVAL DUAL: CPT | Performed by: INTERNAL MEDICINE

## 2024-11-20 PROCEDURE — 1160F RVW MEDS BY RX/DR IN RCRD: CPT | Performed by: NURSE PRACTITIONER

## 2024-11-20 PROCEDURE — 1123F ACP DISCUSS/DSCN MKR DOCD: CPT | Performed by: NURSE PRACTITIONER

## 2024-11-20 PROCEDURE — 1036F TOBACCO NON-USER: CPT | Performed by: NURSE PRACTITIONER

## 2024-11-20 NOTE — PROGRESS NOTES
Chief Complaint   Patient presents with    Hypertension    Other     FLUTTER     Vitals:    11/20/24 1348   BP: 138/88   Site: Left Upper Arm   Position: Sitting   Cuff Size: Medium Adult   Pulse: 68   SpO2: 96%   Weight: 73.8 kg (162 lb 9.6 oz)   Height: 1.778 m (5' 10\")      /88 (Site: Left Upper Arm, Position: Sitting, Cuff Size: Medium Adult)   Pulse 68   Ht 1.778 m (5' 10\")   Wt 73.8 kg (162 lb 9.6 oz)   SpO2 96%   BMI 23.33 kg/m²

## 2024-12-27 DIAGNOSIS — I48.0 PAROXYSMAL ATRIAL FIBRILLATION (HCC): ICD-10-CM

## 2024-12-27 RX ORDER — APIXABAN 5 MG/1
5 TABLET, FILM COATED ORAL 2 TIMES DAILY
Qty: 60 TABLET | Refills: 5 | Status: SHIPPED | OUTPATIENT
Start: 2024-12-27

## 2024-12-27 NOTE — TELEPHONE ENCOUNTER
Med refilled per VO by MD.    Future Appointments   Date Time Provider Department Center   12/3/2025 11:20 AM PACEMAKER, LILIBETH [facility]   12/3/2025 11:40 AM Diogenes Ramos MD [facility] AM[facility]

## 2025-01-28 ENCOUNTER — HOSPITAL ENCOUNTER (OUTPATIENT)
Facility: HOSPITAL | Age: 85
Discharge: HOME | End: 2025-01-31
Attending: ORTHOPAEDIC SURGERY
Payer: MEDICARE

## 2025-01-28 DIAGNOSIS — M25.512 CHRONIC LEFT SHOULDER PAIN: ICD-10-CM

## 2025-01-28 DIAGNOSIS — G89.29 CHRONIC LEFT SHOULDER PAIN: ICD-10-CM

## 2025-01-28 DIAGNOSIS — M19.012 ARTHRITIS OF LEFT SHOULDER REGION: ICD-10-CM

## 2025-01-28 PROCEDURE — 73200 CT UPPER EXTREMITY W/O DYE: CPT

## 2025-02-02 ENCOUNTER — PATIENT MESSAGE (OUTPATIENT)
Age: 85
End: 2025-02-02

## 2025-02-03 NOTE — TELEPHONE ENCOUNTER
We typically only recommend holding Eliquis for up to 2-3 days prior to a reverse shoulder replacement.  If they won't do the surgery without holding for 5 days, we don't have much choice, but our preference would be 2-3 days max, then resume as soon as cleared to do so.

## 2025-02-04 ENCOUNTER — CLINICAL DOCUMENTATION (OUTPATIENT)
Age: 85
End: 2025-02-04

## 2025-05-30 NOTE — Clinical Note
No care due was identified.  [facility] Embedded Care Due Messages. Reference number: [id number].   5/30/2025 12:04:54 AM CDT   No contrast administered during procedure. Amount: 0 mL.

## (undated) DEVICE — AGENT HEMSTAT 3GM PURIFIED PLNT STARCH PWD ABSRB ARISTA AH

## (undated) DEVICE — INTRODUCER SHTH 8.5FR L63CM 8.5FR DIL GWIRE L145CM

## (undated) DEVICE — SYSTEM CLOSURE 6-12 FR VEN VASC VASCADE MVP

## (undated) DEVICE — CATHETER EP XL 2-5-2 MM 6 FRX115 CM LIVEWIRE

## (undated) DEVICE — REM POLYHESIVE ADULT PATIENT RETURN ELECTRODE: Brand: VALLEYLAB

## (undated) DEVICE — INTRO SHTH HEMO 9FR 18G 13CM -- PRELUDE SNAP PEEL-AWAY

## (undated) DEVICE — HANDLE LT SNAP ON ULT DURABLE LENS FOR TRUMPF ALC DISPOSABLE

## (undated) DEVICE — Device

## (undated) DEVICE — PINNACLE INTRODUCER SHEATH: Brand: PINNACLE

## (undated) DEVICE — CABLE CATH L210CM 10 PIN PROTECTED SURELINK WOVENFLEXIE

## (undated) DEVICE — 3M™ IOBAN™ 2 ANTIMICROBIAL INCISE DRAPE 6650EZ: Brand: IOBAN™ 2

## (undated) DEVICE — SUTURE ETHBND EXCEL SZ 2 L30IN NONABSORBABLE GRN L40MM V-37 MX69G

## (undated) DEVICE — LIMB HOLDER, WRIST/ANKLE: Brand: DEROYAL

## (undated) DEVICE — ELECTRODE PT RET AD L9FT HI MOIST COND ADH HYDRGEL CORDED

## (undated) DEVICE — CATHETER REPROC EP 2-5-2 MM BAR201101R

## (undated) DEVICE — 3M™ TEGADERM™ TRANSPARENT FILM DRESSING FRAME STYLE, 1626W, 4 IN X 4-3/4 IN (10 CM X 12 CM), 50/CT 4CT/CASE: Brand: 3M™ TEGADERM™

## (undated) DEVICE — CABLE EP L150CM BLK HEXAPOLAR OCTAPOLAR DECAPOLAR EXTN CONN

## (undated) DEVICE — MEDI-TRACE CADENCE ADULT, DEFIBRILLATION ELECTRODE -RTS  (10 PR/PK) - PHYSIO-CONTROL: Brand: MEDI-TRACE CADENCE

## (undated) DEVICE — KIT ELECTRD SURF FOR DISPLAYING THE 3D POS OF EP CATH

## (undated) DEVICE — NEEDLE ANGIO 18GA L9CM NRML 1 WALL SMOOTH FINISH CLR HUB FOR

## (undated) DEVICE — SUTURE DEV SZ 3-0 V-LOC 90 L12IN TO L18IN CV-23 VLT VLOCM0844

## (undated) DEVICE — INTRO PEELWY HEMVLV 7F 13CM -- SHRT PRELUDE SNAP

## (undated) DEVICE — DRESSING ANTIMIC FOAM OPTIFOAM POSTOP ADH 4 X 6 IN

## (undated) DEVICE — PACEMAKER SETUP: Brand: MEDLINE INDUSTRIES, INC.

## (undated) DEVICE — ELECTRODE,RADIOTRANSLUCENT,FOAM,5PK: Brand: MEDLINE

## (undated) DEVICE — SUTURE V-LOC 180 SZ 2-0 L9IN ABSRB VLT GS-21 L37MM 1/2 CIR VLOCM0345

## (undated) DEVICE — CABLE EP L8FT EXTN CATH SAFIRE 1642 TX

## (undated) DEVICE — HEART CATH-SFMC: Brand: MEDLINE INDUSTRIES, INC.